# Patient Record
Sex: MALE | Race: WHITE | NOT HISPANIC OR LATINO | Employment: OTHER | URBAN - METROPOLITAN AREA
[De-identification: names, ages, dates, MRNs, and addresses within clinical notes are randomized per-mention and may not be internally consistent; named-entity substitution may affect disease eponyms.]

---

## 2018-08-29 ENCOUNTER — HOSPITAL ENCOUNTER (OUTPATIENT)
Dept: CARDIOLOGY | Facility: HOSPITAL | Age: 73
Discharge: HOME | End: 2018-08-29
Attending: UROLOGY | Admitting: UROLOGY

## 2018-08-29 ENCOUNTER — TRANSCRIBE ORDERS (OUTPATIENT)
Dept: LAB | Facility: HOSPITAL | Age: 73
End: 2018-08-29

## 2018-08-29 ENCOUNTER — HOSPITAL ENCOUNTER (OUTPATIENT)
Dept: GENERAL RADIOLOGY | Facility: HOSPITAL | Age: 73
Discharge: HOME | End: 2018-08-29
Attending: UROLOGY

## 2018-08-29 ENCOUNTER — TRANSCRIBE ORDERS (OUTPATIENT)
Dept: CARDIOLOGY | Facility: HOSPITAL | Age: 73
End: 2018-08-29

## 2018-08-29 ENCOUNTER — LAB (OUTPATIENT)
Dept: LAB | Facility: HOSPITAL | Age: 73
End: 2018-08-29
Attending: UROLOGY

## 2018-08-29 DIAGNOSIS — Z01.818 PRE-OP TESTING: Primary | ICD-10-CM

## 2018-08-29 DIAGNOSIS — Z01.818 PRE-OP TESTING: ICD-10-CM

## 2018-08-29 DIAGNOSIS — Z01.811 PRE-OP CHEST EXAM: ICD-10-CM

## 2018-08-29 LAB
ANION GAP SERPL CALCULATED.3IONS-SCNC: 11.4 MMOL/L
BASOPHILS # BLD AUTO: 0.02 10*3/MM3 (ref 0–0.2)
BASOPHILS NFR BLD AUTO: 0.3 % (ref 0–1.5)
BUN BLD-MCNC: 27 MG/DL (ref 8–23)
BUN/CREAT SERPL: 17.3 (ref 7–25)
CALCIUM SPEC-SCNC: 8.9 MG/DL (ref 8.6–10.5)
CHLORIDE SERPL-SCNC: 93 MMOL/L (ref 98–107)
CO2 SERPL-SCNC: 22.6 MMOL/L (ref 22–29)
CREAT BLD-MCNC: 1.56 MG/DL (ref 0.76–1.27)
DEPRECATED RDW RBC AUTO: 40.2 FL (ref 37–54)
EOSINOPHIL # BLD AUTO: 0.22 10*3/MM3 (ref 0–0.7)
EOSINOPHIL NFR BLD AUTO: 3.3 % (ref 0.3–6.2)
ERYTHROCYTE [DISTWIDTH] IN BLOOD BY AUTOMATED COUNT: 11.9 % (ref 11.5–14.5)
GFR SERPL CREATININE-BSD FRML MDRD: 44 ML/MIN/1.73
GLUCOSE BLD-MCNC: 99 MG/DL (ref 65–99)
HCT VFR BLD AUTO: 41 % (ref 40.4–52.2)
HGB BLD-MCNC: 13.6 G/DL (ref 13.7–17.6)
IMM GRANULOCYTES # BLD: 0 10*3/MM3 (ref 0–0.03)
IMM GRANULOCYTES NFR BLD: 0 % (ref 0–0.5)
LYMPHOCYTES # BLD AUTO: 1.09 10*3/MM3 (ref 0.9–4.8)
LYMPHOCYTES NFR BLD AUTO: 16.3 % (ref 19.6–45.3)
MCH RBC QN AUTO: 30.6 PG (ref 27–32.7)
MCHC RBC AUTO-ENTMCNC: 33.2 G/DL (ref 32.6–36.4)
MCV RBC AUTO: 92.1 FL (ref 79.8–96.2)
MONOCYTES # BLD AUTO: 0.82 10*3/MM3 (ref 0.2–1.2)
MONOCYTES NFR BLD AUTO: 12.2 % (ref 5–12)
NEUTROPHILS # BLD AUTO: 4.55 10*3/MM3 (ref 1.9–8.1)
NEUTROPHILS NFR BLD AUTO: 67.9 % (ref 42.7–76)
PLATELET # BLD AUTO: 163 10*3/MM3 (ref 140–500)
PMV BLD AUTO: 10.4 FL (ref 6–12)
POTASSIUM BLD-SCNC: 4.4 MMOL/L (ref 3.5–5.2)
RBC # BLD AUTO: 4.45 10*6/MM3 (ref 4.6–6)
SODIUM BLD-SCNC: 127 MMOL/L (ref 136–145)
WBC NRBC COR # BLD: 6.7 10*3/MM3 (ref 4.5–10.7)

## 2018-08-29 PROCEDURE — 85025 COMPLETE CBC W/AUTO DIFF WBC: CPT

## 2018-08-29 PROCEDURE — 36415 COLL VENOUS BLD VENIPUNCTURE: CPT

## 2018-08-29 PROCEDURE — 80048 BASIC METABOLIC PNL TOTAL CA: CPT

## 2018-08-29 PROCEDURE — 93005 ELECTROCARDIOGRAM TRACING: CPT | Performed by: UROLOGY

## 2018-08-29 PROCEDURE — 71046 X-RAY EXAM CHEST 2 VIEWS: CPT

## 2018-08-29 PROCEDURE — 93010 ELECTROCARDIOGRAM REPORT: CPT | Performed by: INTERNAL MEDICINE

## 2018-09-25 ENCOUNTER — HOSPITAL ENCOUNTER (OUTPATIENT)
Dept: OTHER | Facility: HOSPITAL | Age: 73
Setting detail: SPECIMEN
Discharge: HOME | End: 2018-09-25
Attending: UROLOGY | Admitting: UROLOGY

## 2018-09-26 ENCOUNTER — HOSPITAL ENCOUNTER (OUTPATIENT)
Dept: OTHER | Facility: HOSPITAL | Age: 73
Setting detail: SPECIMEN
Discharge: HOME | End: 2018-09-26
Attending: UROLOGY | Admitting: UROLOGY

## 2018-09-26 LAB
ANION GAP SERPL CALC-SCNC: 13.9 MMOL/L (ref 10–20)
BACTERIA SPEC AEROBE CULT: NORMAL
BASOPHILS # BLD AUTO: 0 10*3/UL (ref 0–0.2)
BASOPHILS NFR BLD AUTO: 0 % (ref 0–2)
BUN SERPL-MCNC: 24 MG/DL (ref 8–20)
BUN/CREAT SERPL: 12 (ref 6.2–20.3)
CALCIUM SERPL-MCNC: 7.5 MG/DL (ref 8.9–10.3)
CHLORIDE SERPL-SCNC: 94 MMOL/L (ref 101–111)
CONV CO2: 22 MMOL/L (ref 22–32)
CREAT UR-MCNC: 2 MG/DL (ref 0.7–1.2)
DIFFERENTIAL METHOD BLD: (no result)
EOSINOPHIL # BLD AUTO: 0.1 10*3/UL (ref 0–0.3)
EOSINOPHIL # BLD AUTO: 1 % (ref 0–3)
ERYTHROCYTE [DISTWIDTH] IN BLOOD BY AUTOMATED COUNT: 12.8 % (ref 11.5–14.5)
GLUCOSE SERPL-MCNC: 122 MG/DL (ref 65–99)
HCT VFR BLD AUTO: 35.5 % (ref 40–54)
HGB BLD-MCNC: 12.1 G/DL (ref 14–18)
LYMPHOCYTES # BLD AUTO: 0.7 10*3/UL (ref 0.8–4.8)
LYMPHOCYTES NFR BLD AUTO: 5 % (ref 18–42)
Lab: NORMAL
MCH RBC QN AUTO: 31.7 PG (ref 26–32)
MCHC RBC AUTO-ENTMCNC: 34 G/DL (ref 32–36)
MCV RBC AUTO: 93 FL (ref 80–94)
MICRO REPORT STATUS: NORMAL
MONOCYTES # BLD AUTO: 1 10*3/UL (ref 0.1–1.3)
MONOCYTES NFR BLD AUTO: 7 % (ref 2–11)
NEUTROPHILS # BLD AUTO: 11.8 10*3/UL (ref 2.3–8.6)
NEUTROPHILS NFR BLD AUTO: 87 % (ref 50–75)
NRBC BLD AUTO-RTO: 0 /100{WBCS}
NRBC/RBC NFR BLD MANUAL: 0 10*3/UL
PLATELET # BLD AUTO: 260 10*3/UL (ref 150–450)
PMV BLD AUTO: 8.5 FL (ref 7.4–10.4)
POTASSIUM SERPL-SCNC: 4.9 MMOL/L (ref 3.6–5.1)
RBC # BLD AUTO: 3.82 10*6/UL (ref 4.6–6)
SODIUM SERPL-SCNC: 125 MMOL/L (ref 136–144)
SPECIMEN SOURCE: NORMAL
WBC # BLD AUTO: 13.6 10*3/UL (ref 4.5–11.5)

## 2018-10-10 ENCOUNTER — APPOINTMENT (OUTPATIENT)
Dept: GENERAL RADIOLOGY | Facility: HOSPITAL | Age: 73
End: 2018-10-10

## 2018-10-10 ENCOUNTER — APPOINTMENT (OUTPATIENT)
Dept: CARDIOLOGY | Facility: HOSPITAL | Age: 73
End: 2018-10-10
Attending: EMERGENCY MEDICINE

## 2018-10-10 ENCOUNTER — HOSPITAL ENCOUNTER (EMERGENCY)
Facility: HOSPITAL | Age: 73
Discharge: HOME | End: 2018-10-10
Attending: EMERGENCY MEDICINE | Admitting: EMERGENCY MEDICINE

## 2018-10-10 VITALS
OXYGEN SATURATION: 98 % | SYSTOLIC BLOOD PRESSURE: 143 MMHG | HEIGHT: 70 IN | WEIGHT: 193.9 LBS | HEART RATE: 78 BPM | BODY MASS INDEX: 27.76 KG/M2 | DIASTOLIC BLOOD PRESSURE: 82 MMHG | TEMPERATURE: 99.9 F | RESPIRATION RATE: 16 BRPM

## 2018-10-10 DIAGNOSIS — N39.0 ACUTE UTI: Primary | ICD-10-CM

## 2018-10-10 DIAGNOSIS — D72.828 OTHER ELEVATED WHITE BLOOD CELL (WBC) COUNT: ICD-10-CM

## 2018-10-10 LAB
ALBUMIN SERPL-MCNC: 3.8 G/DL (ref 3.5–5.2)
ALBUMIN/GLOB SERPL: 1.2 G/DL
ALP SERPL-CCNC: 93 U/L (ref 39–117)
ALT SERPL W P-5'-P-CCNC: 23 U/L (ref 1–41)
ANION GAP SERPL CALCULATED.3IONS-SCNC: 11.1 MMOL/L
AST SERPL-CCNC: 18 U/L (ref 1–40)
BACTERIA UR QL AUTO: ABNORMAL /HPF
BASOPHILS # BLD AUTO: 0.03 10*3/MM3 (ref 0–0.2)
BASOPHILS NFR BLD AUTO: 0.2 % (ref 0–1.5)
BILIRUB SERPL-MCNC: 0.7 MG/DL (ref 0.1–1.2)
BILIRUB UR QL STRIP: NEGATIVE
BUN BLD-MCNC: 26 MG/DL (ref 8–23)
BUN/CREAT SERPL: 24.8 (ref 7–25)
CALCIUM SPEC-SCNC: 9.2 MG/DL (ref 8.6–10.5)
CHLORIDE SERPL-SCNC: 95 MMOL/L (ref 98–107)
CLARITY UR: ABNORMAL
CO2 SERPL-SCNC: 26.9 MMOL/L (ref 22–29)
COLOR UR: YELLOW
CREAT BLD-MCNC: 1.05 MG/DL (ref 0.76–1.27)
D DIMER PPP FEU-MCNC: 2.3 MCGFEU/ML (ref 0–0.49)
D-LACTATE SERPL-SCNC: 1 MMOL/L (ref 0.5–2)
DEPRECATED RDW RBC AUTO: 42.2 FL (ref 37–54)
EOSINOPHIL # BLD AUTO: 0.18 10*3/MM3 (ref 0–0.7)
EOSINOPHIL NFR BLD AUTO: 1.1 % (ref 0.3–6.2)
ERYTHROCYTE [DISTWIDTH] IN BLOOD BY AUTOMATED COUNT: 12.5 % (ref 11.5–14.5)
GFR SERPL CREATININE-BSD FRML MDRD: 69 ML/MIN/1.73
GLOBULIN UR ELPH-MCNC: 3.3 GM/DL
GLUCOSE BLD-MCNC: 99 MG/DL (ref 65–99)
GLUCOSE UR STRIP-MCNC: NEGATIVE MG/DL
HCT VFR BLD AUTO: 40.5 % (ref 40.4–52.2)
HGB BLD-MCNC: 13 G/DL (ref 13.7–17.6)
HGB UR QL STRIP.AUTO: ABNORMAL
HOLD SPECIMEN: NORMAL
HOLD SPECIMEN: NORMAL
HYALINE CASTS UR QL AUTO: ABNORMAL /LPF
IMM GRANULOCYTES # BLD: 0.08 10*3/MM3 (ref 0–0.03)
IMM GRANULOCYTES NFR BLD: 0.5 % (ref 0–0.5)
KETONES UR QL STRIP: NEGATIVE
LEUKOCYTE ESTERASE UR QL STRIP.AUTO: ABNORMAL
LYMPHOCYTES # BLD AUTO: 1.25 10*3/MM3 (ref 0.9–4.8)
LYMPHOCYTES NFR BLD AUTO: 7.7 % (ref 19.6–45.3)
MCH RBC QN AUTO: 29.7 PG (ref 27–32.7)
MCHC RBC AUTO-ENTMCNC: 32.1 G/DL (ref 32.6–36.4)
MCV RBC AUTO: 92.5 FL (ref 79.8–96.2)
MONOCYTES # BLD AUTO: 0.62 10*3/MM3 (ref 0.2–1.2)
MONOCYTES NFR BLD AUTO: 3.8 % (ref 5–12)
NEUTROPHILS # BLD AUTO: 14.12 10*3/MM3 (ref 1.9–8.1)
NEUTROPHILS NFR BLD AUTO: 86.7 % (ref 42.7–76)
NITRITE UR QL STRIP: POSITIVE
PH UR STRIP.AUTO: 6 [PH] (ref 5–8)
PLATELET # BLD AUTO: 313 10*3/MM3 (ref 140–500)
PMV BLD AUTO: 9.8 FL (ref 6–12)
POTASSIUM BLD-SCNC: 4.4 MMOL/L (ref 3.5–5.2)
PROT SERPL-MCNC: 7.1 G/DL (ref 6–8.5)
PROT UR QL STRIP: ABNORMAL
RBC # BLD AUTO: 4.38 10*6/MM3 (ref 4.6–6)
RBC # UR: ABNORMAL /HPF
REF LAB TEST METHOD: ABNORMAL
SODIUM BLD-SCNC: 133 MMOL/L (ref 136–145)
SP GR UR STRIP: 1.01 (ref 1–1.03)
SQUAMOUS #/AREA URNS HPF: ABNORMAL /HPF
UROBILINOGEN UR QL STRIP: ABNORMAL
WBC NRBC COR # BLD: 16.28 10*3/MM3 (ref 4.5–10.7)
WBC UR QL AUTO: ABNORMAL /HPF
WHOLE BLOOD HOLD SPECIMEN: NORMAL
WHOLE BLOOD HOLD SPECIMEN: NORMAL

## 2018-10-10 PROCEDURE — 83605 ASSAY OF LACTIC ACID: CPT | Performed by: EMERGENCY MEDICINE

## 2018-10-10 PROCEDURE — 87077 CULTURE AEROBIC IDENTIFY: CPT | Performed by: EMERGENCY MEDICINE

## 2018-10-10 PROCEDURE — 96365 THER/PROPH/DIAG IV INF INIT: CPT

## 2018-10-10 PROCEDURE — 81001 URINALYSIS AUTO W/SCOPE: CPT | Performed by: EMERGENCY MEDICINE

## 2018-10-10 PROCEDURE — 96361 HYDRATE IV INFUSION ADD-ON: CPT

## 2018-10-10 PROCEDURE — 93970 EXTREMITY STUDY: CPT

## 2018-10-10 PROCEDURE — 80053 COMPREHEN METABOLIC PANEL: CPT | Performed by: EMERGENCY MEDICINE

## 2018-10-10 PROCEDURE — 85379 FIBRIN DEGRADATION QUANT: CPT | Performed by: EMERGENCY MEDICINE

## 2018-10-10 PROCEDURE — 99285 EMERGENCY DEPT VISIT HI MDM: CPT

## 2018-10-10 PROCEDURE — 85025 COMPLETE CBC W/AUTO DIFF WBC: CPT | Performed by: EMERGENCY MEDICINE

## 2018-10-10 PROCEDURE — 25010000002 CEFTRIAXONE PER 250 MG: Performed by: EMERGENCY MEDICINE

## 2018-10-10 PROCEDURE — 87186 SC STD MICRODIL/AGAR DIL: CPT | Performed by: EMERGENCY MEDICINE

## 2018-10-10 PROCEDURE — 87040 BLOOD CULTURE FOR BACTERIA: CPT | Performed by: EMERGENCY MEDICINE

## 2018-10-10 PROCEDURE — 87086 URINE CULTURE/COLONY COUNT: CPT | Performed by: EMERGENCY MEDICINE

## 2018-10-10 PROCEDURE — 71046 X-RAY EXAM CHEST 2 VIEWS: CPT

## 2018-10-10 RX ORDER — CEFTRIAXONE SODIUM 1 G/50ML
1 INJECTION, SOLUTION INTRAVENOUS ONCE
Status: COMPLETED | OUTPATIENT
Start: 2018-10-10 | End: 2018-10-10

## 2018-10-10 RX ORDER — VENLAFAXINE HYDROCHLORIDE 150 MG/1
150 CAPSULE, EXTENDED RELEASE ORAL DAILY
COMMUNITY

## 2018-10-10 RX ORDER — CIPROFLOXACIN 500 MG/1
500 TABLET, FILM COATED ORAL 2 TIMES DAILY
Qty: 20 TABLET | Refills: 0 | Status: SHIPPED | OUTPATIENT
Start: 2018-10-10 | End: 2018-10-12 | Stop reason: HOSPADM

## 2018-10-10 RX ORDER — ACETAMINOPHEN 500 MG
1000 TABLET ORAL ONCE
Status: COMPLETED | OUTPATIENT
Start: 2018-10-10 | End: 2018-10-10

## 2018-10-10 RX ORDER — AMLODIPINE BESYLATE 10 MG/1
10 TABLET ORAL DAILY
COMMUNITY

## 2018-10-10 RX ORDER — SODIUM CHLORIDE 0.9 % (FLUSH) 0.9 %
10 SYRINGE (ML) INJECTION AS NEEDED
Status: DISCONTINUED | OUTPATIENT
Start: 2018-10-10 | End: 2018-10-11 | Stop reason: HOSPADM

## 2018-10-10 RX ADMIN — CEFTRIAXONE SODIUM 1 G: 1 INJECTION, SOLUTION INTRAVENOUS at 20:59

## 2018-10-10 RX ADMIN — ACETAMINOPHEN 1000 MG: 500 TABLET, FILM COATED ORAL at 18:33

## 2018-10-10 RX ADMIN — SODIUM CHLORIDE 1000 ML: 9 INJECTION, SOLUTION INTRAVENOUS at 17:55

## 2018-10-10 NOTE — ED PROVIDER NOTES
" EMERGENCY DEPARTMENT ENCOUNTER    CHIEF COMPLAINT  Chief Complaint: R leg pain  History given by: pt  History limited by: none  Room Number: 15/15  PMD: System, Provider Not In  Urologist: Dr Weber    HPI:  Pt is a 73 y.o. male who presents complaining of severe \"aches\" in right thigh that has gotten progressively worse since 0200 this morning. Pt also admits to chills but denies fever, cough, SOA, hematuria, vomiting, diarrhea, decreased appetite, abdominal pain, back pain. Pt reports that he had surgery to repair his prostate two weeks ago and the pt went to see the urologist today due to aches and was sent here to rule out blood clots    Duration:  15 hours  Onset: gradual  Timing: constant  Location: right thigh  Radiation: none  Quality: \"aches\"  Intensity/Severity: severe  Progression: progressively worse  Associated Symptoms: chills  Aggravating Factors: none  Alleviating Factors: none  Previous Episodes: none  Treatment before arrival: none    PAST MEDICAL HISTORY  Active Ambulatory Problems     Diagnosis Date Noted   • No Active Ambulatory Problems     Resolved Ambulatory Problems     Diagnosis Date Noted   • No Resolved Ambulatory Problems     Past Medical History:   Diagnosis Date   • Depression    • History of nephrectomy    • Hypertension    • Mitral valve prolapse    • Sepsis (CMS/HCC)    • UTI (urinary tract infection)        PAST SURGICAL HISTORY  Past Surgical History:   Procedure Laterality Date   • HERNIA REPAIR     • NEPHRECTOMY Left    • ROTATOR CUFF REPAIR Left        FAMILY HISTORY  History reviewed. No pertinent family history.    SOCIAL HISTORY  Social History     Social History   • Marital status:      Spouse name: N/A   • Number of children: N/A   • Years of education: N/A     Occupational History   • Not on file.     Social History Main Topics   • Smoking status: Former Smoker     Quit date: 10/10/1984   • Smokeless tobacco: Never Used   • Alcohol use 16.8 oz/week     28 Standard " drinks or equivalent per week   • Drug use: No   • Sexual activity: Defer     Other Topics Concern   • Not on file     Social History Narrative   • No narrative on file       ALLERGIES  Patient has no known allergies.    REVIEW OF SYSTEMS  Review of Systems   Constitutional: Positive for chills. Negative for activity change, appetite change and fever.   HENT: Negative for congestion and sore throat.    Eyes: Negative.    Respiratory: Negative for cough and shortness of breath.    Cardiovascular: Negative for chest pain and leg swelling.   Gastrointestinal: Negative for abdominal pain, diarrhea and vomiting.   Endocrine: Negative.    Genitourinary: Negative for decreased urine volume and dysuria.   Musculoskeletal: Positive for myalgias (right thigh). Negative for neck pain.   Skin: Negative for rash and wound.   Allergic/Immunologic: Negative.    Neurological: Negative for weakness, numbness and headaches.   Hematological: Negative.    Psychiatric/Behavioral: Negative.    All other systems reviewed and are negative.      PHYSICAL EXAM  ED Triage Vitals [10/10/18 1727]   Temp Heart Rate Resp BP SpO2   100.3 °F (37.9 °C) 84 18 -- 99 %      Temp src Heart Rate Source Patient Position BP Location FiO2 (%)   Tympanic Monitor -- -- --       Physical Exam   Constitutional: He is oriented to person, place, and time. No distress.   Pt is shaking with chills   HENT:   Head: Normocephalic and atraumatic.   Eyes: Pupils are equal, round, and reactive to light. EOM are normal.   Neck: Normal range of motion. Neck supple.   Cardiovascular: Regular rhythm and normal heart sounds.  Tachycardia present.    Pulmonary/Chest: Effort normal and breath sounds normal. No respiratory distress.   Abdominal: Soft. There is no tenderness. There is no rebound and no guarding.   Musculoskeletal: Normal range of motion. He exhibits no edema.   Muscle tenderness to palpation of bilateral legs particularly in the right thigh with no swelling,  cord, or clinical signs of DVT or cellulitis   Neurological: He is alert and oriented to person, place, and time. He has normal sensation and normal strength.   Skin: Skin is warm and dry.   Psychiatric: Mood and affect normal.   Nursing note and vitals reviewed.    LAB RESULTS  Lab Results (last 24 hours)     Procedure Component Value Units Date/Time    CBC & Differential [[id number]] Collected:  10/10/18 1759    Specimen:  Blood Updated:  10/10/18 1824    Narrative:       The following orders were created for panel order CBC & Differential.  Procedure                               Abnormality         Status                     ---------                               -----------         ------                     CBC Auto Differential[[id number]]        Abnormal            Final result                 Please view results for these tests on the individual orders.    Blood Culture - Blood, [[id number]] Collected:  10/10/18 1759    Specimen:  Blood from Arm, Left Updated:  10/10/18 1815    D-dimer, Quantitative [[id number]]  (Abnormal) Collected:  10/10/18 1759    Specimen:  Blood Updated:  10/10/18 1849     D-Dimer, Quantitative 2.30 (H) MCGFEU/mL     Narrative:       The Stago D-Dimer test used in conjunction with a clinical pretest probability (PTP) assessment model, has been approved by the FDA to rule out the presence of venous thromboembolism (VTE) in outpatients suspected of deep venous thrombosis (DVT) or pulmonary embolism (PE).     CBC Auto Differential [[id number]]  (Abnormal) Collected:  10/10/18 1759    Specimen:  Blood Updated:  10/10/18 1824     WBC 16.28 (H) 10*3/mm3      RBC 4.38 (L) 10*6/mm3      Hemoglobin 13.0 (L) g/dL      Hematocrit 40.5 %      MCV 92.5 fL      MCH 29.7 pg      MCHC 32.1 (L) g/dL      RDW 12.5 %      RDW-SD 42.2 fl      MPV 9.8 fL      Platelets 313 10*3/mm3      Neutrophil % 86.7 (H) %      Lymphocyte % 7.7 (L) %      Monocyte % 3.8 (L) %      Eosinophil % 1.1 %      Basophil % 0.2 %       Immature Grans % 0.5 %      Neutrophils, Absolute 14.12 (H) 10*3/mm3      Lymphocytes, Absolute 1.25 10*3/mm3      Monocytes, Absolute 0.62 10*3/mm3      Eosinophils, Absolute 0.18 10*3/mm3      Basophils, Absolute 0.03 10*3/mm3      Immature Grans, Absolute 0.08 (H) 10*3/mm3     Lactic Acid, Plasma [[id number]]  (Normal) Collected:  10/10/18 1843    Specimen:  Blood Updated:  10/10/18 2016     Lactate 1.0 mmol/L     Blood Culture - Blood, [[id number]] Collected:  10/10/18 1843    Specimen:  Blood from Arm, Left Updated:  10/10/18 1847    Comprehensive Metabolic Panel [[id number]]  (Abnormal) Collected:  10/10/18 1901    Specimen:  Blood Updated:  10/10/18 1938     Glucose 99 mg/dL      BUN 26 (H) mg/dL      Creatinine 1.05 mg/dL      Sodium 133 (L) mmol/L      Potassium 4.4 mmol/L      Chloride 95 (L) mmol/L      CO2 26.9 mmol/L      Calcium 9.2 mg/dL      Total Protein 7.1 g/dL      Albumin 3.80 g/dL      ALT (SGPT) 23 U/L      AST (SGOT) 18 U/L      Alkaline Phosphatase 93 U/L      Total Bilirubin 0.7 mg/dL      eGFR Non African Amer 69 mL/min/1.73      Globulin 3.3 gm/dL      A/G Ratio 1.2 g/dL      BUN/Creatinine Ratio 24.8     Anion Gap 11.1 mmol/L     Narrative:       The MDRD GFR formula is only valid for adults with stable renal function between ages 18 and 70.    Urinalysis With Culture If Indicated - Urine, Clean Catch [[id number]]  (Abnormal) Collected:  10/10/18 2027    Specimen:  Urine from Urine, Clean Catch Updated:  10/10/18 2040     Color, UA Yellow     Appearance, UA Turbid (A)     pH, UA 6.0     Specific Gravity, UA 1.014     Glucose, UA Negative     Ketones, UA Negative     Bilirubin, UA Negative     Blood, UA Large (3+) (A)     Protein,  mg/dL (2+) (A)     Leuk Esterase, UA Large (3+) (A)     Nitrite, UA Positive (A)     Urobilinogen, UA 0.2 E.U./dL    Urinalysis, Microscopic Only - Urine, Clean Catch [[id number]]  (Abnormal) Collected:  10/10/18 2027    Specimen:  Urine from Urine, Clean  Catch Updated:  10/10/18 2041     RBC, UA Too Numerous to Count (A) /HPF      WBC, UA Too Numerous to Count (A) /HPF      Bacteria, UA None Seen /HPF      Squamous Epithelial Cells, UA 0-2 /HPF      Hyaline Casts, UA 3-6 /LPF      Methodology Automated Microscopy    Urine Culture - Urine, [[id number]] Collected:  10/10/18 2027    Specimen:  Urine from Urine, Clean Catch Updated:  10/10/18 2041          I ordered the above labs and reviewed the results    RADIOLOGY  XR Chest 2 View   Compared to the previous chest x-ray dated 8/29/2018. The lungs are  fairly well-expanded and appear free of focal infiltrates. There are no  pleural effusions. The cardiomediastinal silhouette is unremarkable.     IMPRESSIONS: No evidence of active disease within the chest.     This report was finalized on 10/10/2018 6:33 PM by Dr. Luis Kim M.D.   Doppler  Negative        I ordered the above noted radiological studies. Interpreted by radiologist. Reviewed by me in PACS.       PROCEDURES  Procedures      PROGRESS AND CONSULTS     1742  Ordered labs and chest XR for further viewing. Ordered tylenol for fever and IV fluids.    1840  Ordered Doppler for further viewing. Result was negative for DVT.    2044  Ordered rocephin for infection. Ordered call from urology.    2046  Rechecked patient who is resting comfortably. Discussed all lab and test results. Discussed plan to start antibiotics and talk to pt urologist. Pt understands and agrees with the plan. All questions have been answered.    2054  Discussed case with Dr Zimmerman, Urology  Reviewed history, exam, results and treatments.  Discussed concerns and plan of care.   Dr Zimmerman recommends a shot of rocephin, putting the pt on Cipro, and having the pt follow up in the office in the next few days.     2110  Rechecked patient who is resting comfortably. Discussed all lab and test results. Discussed plan to discharge the pt and have the pt follow up with urologist unless he  experiences any high fever and then he should RTER. Pt understands and agrees with the plan. All questions have been answered.          MEDICAL DECISION MAKING  Results were reviewed/discussed with the patient and they were also made aware of online access. Pt also made aware that some labs, such as cultures, will not be resulted during ER visit and follow up with PMD is necessary.     MDM  Number of Diagnoses or Management Options     Amount and/or Complexity of Data Reviewed  Clinical lab tests: ordered and reviewed (D-Dimer 2.30, WBC 16.28, UA: RBC too numerous to count, WBC too numerous to count)  Tests in the radiology section of CPT®: ordered and reviewed (Doppler- Negative  Chest XR - NAD)  Decide to obtain previous medical records or to obtain history from someone other than the patient: yes (EPIC)           DIAGNOSIS  Final diagnoses:   Acute UTI   Other elevated white blood cell (WBC) count       DISPOSITION  DISCHARGE    Patient discharged in stable condition.    Reviewed implications of results, diagnosis, meds, responsibility to follow up, warning signs and symptoms of possible worsening, potential complications and reasons to return to ER.    Patient/Family voiced understanding of above instructions.    Discussed plan for discharge, as there is no emergent indication for admission. Patient referred to primary care provider for BP management due to today's BP. Pt/family is agreeable and understands need for follow up and repeat testing.  Pt is aware that discharge does not mean that nothing is wrong but it indicates no emergency is present that requires admission and they must continue care with follow-up as given below or physician of their choice.     FOLLOW-UP  Catarino Weber Jr., MD  [address] [place] [address] [place] [address] [phone]      call office tomorrow for close follow up in 1 to 2 days         Medication List      New Prescriptions    ciprofloxacin 500 MG tablet  Commonly known  as:  CIPRO  Take 1 tablet by mouth 2 (Two) Times a Day.              Latest Documented Vital Signs:  As of 9:14 PM  BP- 150/80 HR- 77 Temp- (!) 100.6 °F (38.1 °C) (Oral) O2 sat- 97%    --  Documentation assistance provided by vi Green for Dr Garcia.  Information recorded by the scribe was done at my direction and has been verified and validated by me.                 Mary Green  10/10/18 2114       Gómez Garcia MD  10/10/18 8358

## 2018-10-10 NOTE — ED NOTES
"Pt reports severe aches in thighs bilaterally since about 2 am this morning. Pt had his prostate repaired 2 weeks ago. Recovery was going okay until this am when he developed severe pain in his thighs. Pt states he was in Dr. Weber's  Office today and he sent him here to \"check for a bladder infection and blood clots\". Pt reports passing a little blood in the urine after his surgery, but that has stopped. Pt reports prior history of urosepsis. Pt denies increased urinary frequency or dysuria. Pt is alert and oriented. Pt is shivering.     Anisa Magaña, VEL  10/10/18 3417    "

## 2018-10-10 NOTE — PROGRESS NOTES
BLEV doppler completed.  Preliminary results:  Negative for DVT and STP in bilateral lower extremities.  Results given to Dr. Garcia.

## 2018-10-11 ENCOUNTER — HOSPITAL ENCOUNTER (OUTPATIENT)
Facility: HOSPITAL | Age: 73
Setting detail: OBSERVATION
Discharge: HOME | End: 2018-10-12
Attending: UROLOGY | Admitting: UROLOGY

## 2018-10-11 LAB
BH CV LOW VAS LEFT SAPHENOFEMORAL JUNCTION SPONT: 1
BH CV LOWER VASCULAR LEFT COMMON FEMORAL AUGMENT: NORMAL
BH CV LOWER VASCULAR LEFT COMMON FEMORAL COMPETENT: NORMAL
BH CV LOWER VASCULAR LEFT COMMON FEMORAL COMPRESS: NORMAL
BH CV LOWER VASCULAR LEFT COMMON FEMORAL PHASIC: NORMAL
BH CV LOWER VASCULAR LEFT COMMON FEMORAL SPONT: NORMAL
BH CV LOWER VASCULAR LEFT DISTAL FEMORAL COMPRESS: NORMAL
BH CV LOWER VASCULAR LEFT GASTRONEMIUS COMPRESS: NORMAL
BH CV LOWER VASCULAR LEFT GREATER SAPH AK COMPRESS: NORMAL
BH CV LOWER VASCULAR LEFT GREATER SAPH BK COMPRESS: NORMAL
BH CV LOWER VASCULAR LEFT MID FEMORAL AUGMENT: NORMAL
BH CV LOWER VASCULAR LEFT MID FEMORAL COMPETENT: NORMAL
BH CV LOWER VASCULAR LEFT MID FEMORAL COMPRESS: NORMAL
BH CV LOWER VASCULAR LEFT MID FEMORAL PHASIC: NORMAL
BH CV LOWER VASCULAR LEFT MID FEMORAL SPONT: NORMAL
BH CV LOWER VASCULAR LEFT PERONEAL COMPRESS: NORMAL
BH CV LOWER VASCULAR LEFT POPLITEAL AUGMENT: NORMAL
BH CV LOWER VASCULAR LEFT POPLITEAL COMPETENT: NORMAL
BH CV LOWER VASCULAR LEFT POPLITEAL COMPRESS: NORMAL
BH CV LOWER VASCULAR LEFT POPLITEAL PHASIC: NORMAL
BH CV LOWER VASCULAR LEFT POPLITEAL SPONT: NORMAL
BH CV LOWER VASCULAR LEFT POSTERIOR TIBIAL COMPRESS: NORMAL
BH CV LOWER VASCULAR LEFT PROXIMAL FEMORAL COMPRESS: NORMAL
BH CV LOWER VASCULAR LEFT SAPHENOFEMORAL JUNCTION AUGMENT: NORMAL
BH CV LOWER VASCULAR LEFT SAPHENOFEMORAL JUNCTION COMPETENT: NORMAL
BH CV LOWER VASCULAR LEFT SAPHENOFEMORAL JUNCTION COMPRESS: NORMAL
BH CV LOWER VASCULAR LEFT SAPHENOFEMORAL JUNCTION PHASIC: NORMAL
BH CV LOWER VASCULAR LEFT SAPHENOFEMORAL JUNCTION SPONT: NORMAL
BH CV LOWER VASCULAR RIGHT COMMON FEMORAL AUGMENT: NORMAL
BH CV LOWER VASCULAR RIGHT COMMON FEMORAL COMPETENT: NORMAL
BH CV LOWER VASCULAR RIGHT COMMON FEMORAL COMPRESS: NORMAL
BH CV LOWER VASCULAR RIGHT COMMON FEMORAL PHASIC: NORMAL
BH CV LOWER VASCULAR RIGHT COMMON FEMORAL SPONT: NORMAL
BH CV LOWER VASCULAR RIGHT DISTAL FEMORAL COMPRESS: NORMAL
BH CV LOWER VASCULAR RIGHT GASTRONEMIUS COMPRESS: NORMAL
BH CV LOWER VASCULAR RIGHT GREATER SAPH AK COMPRESS: NORMAL
BH CV LOWER VASCULAR RIGHT GREATER SAPH BK COMPRESS: NORMAL
BH CV LOWER VASCULAR RIGHT MID FEMORAL AUGMENT: NORMAL
BH CV LOWER VASCULAR RIGHT MID FEMORAL COMPETENT: NORMAL
BH CV LOWER VASCULAR RIGHT MID FEMORAL COMPRESS: NORMAL
BH CV LOWER VASCULAR RIGHT MID FEMORAL PHASIC: NORMAL
BH CV LOWER VASCULAR RIGHT MID FEMORAL SPONT: NORMAL
BH CV LOWER VASCULAR RIGHT PERONEAL COMPRESS: NORMAL
BH CV LOWER VASCULAR RIGHT POPLITEAL AUGMENT: NORMAL
BH CV LOWER VASCULAR RIGHT POPLITEAL COMPETENT: NORMAL
BH CV LOWER VASCULAR RIGHT POPLITEAL COMPRESS: NORMAL
BH CV LOWER VASCULAR RIGHT POPLITEAL PHASIC: NORMAL
BH CV LOWER VASCULAR RIGHT POPLITEAL SPONT: NORMAL
BH CV LOWER VASCULAR RIGHT POSTERIOR TIBIAL COMPRESS: NORMAL
BH CV LOWER VASCULAR RIGHT PROXIMAL FEMORAL COMPRESS: NORMAL
BH CV LOWER VASCULAR RIGHT SAPHENOFEMORAL JUNCTION AUGMENT: NORMAL
BH CV LOWER VASCULAR RIGHT SAPHENOFEMORAL JUNCTION COMPETENT: NORMAL
BH CV LOWER VASCULAR RIGHT SAPHENOFEMORAL JUNCTION COMPRESS: NORMAL
BH CV LOWER VASCULAR RIGHT SAPHENOFEMORAL JUNCTION PHASIC: NORMAL
BH CV LOWER VASCULAR RIGHT SAPHENOFEMORAL JUNCTION SPONT: NORMAL

## 2018-10-11 PROCEDURE — G0378 HOSPITAL OBSERVATION PER HR: HCPCS

## 2018-10-11 PROCEDURE — 96365 THER/PROPH/DIAG IV INF INIT: CPT

## 2018-10-11 PROCEDURE — 96361 HYDRATE IV INFUSION ADD-ON: CPT

## 2018-10-11 PROCEDURE — 87040 BLOOD CULTURE FOR BACTERIA: CPT | Performed by: UROLOGY

## 2018-10-11 PROCEDURE — 25010000002 CEFTRIAXONE PER 250 MG: Performed by: UROLOGY

## 2018-10-11 RX ORDER — SODIUM CHLORIDE 9 MG/ML
100 INJECTION, SOLUTION INTRAVENOUS CONTINUOUS
Status: DISCONTINUED | OUTPATIENT
Start: 2018-10-11 | End: 2018-10-12 | Stop reason: HOSPADM

## 2018-10-11 RX ORDER — AMLODIPINE BESYLATE 10 MG/1
10 TABLET ORAL
Status: DISCONTINUED | OUTPATIENT
Start: 2018-10-11 | End: 2018-10-12 | Stop reason: HOSPADM

## 2018-10-11 RX ORDER — CEFTRIAXONE SODIUM 1 G/50ML
1 INJECTION, SOLUTION INTRAVENOUS EVERY 24 HOURS
Status: DISCONTINUED | OUTPATIENT
Start: 2018-10-11 | End: 2018-10-12 | Stop reason: HOSPADM

## 2018-10-11 RX ORDER — CLONAZEPAM 1 MG/1
1 TABLET ORAL NIGHTLY PRN
Status: DISCONTINUED | OUTPATIENT
Start: 2018-10-11 | End: 2018-10-12 | Stop reason: HOSPADM

## 2018-10-11 RX ORDER — ONDANSETRON 2 MG/ML
4 INJECTION INTRAMUSCULAR; INTRAVENOUS EVERY 6 HOURS PRN
Status: DISCONTINUED | OUTPATIENT
Start: 2018-10-11 | End: 2018-10-12 | Stop reason: HOSPADM

## 2018-10-11 RX ORDER — VENLAFAXINE HYDROCHLORIDE 150 MG/1
150 CAPSULE, EXTENDED RELEASE ORAL
Status: DISCONTINUED | OUTPATIENT
Start: 2018-10-12 | End: 2018-10-12 | Stop reason: HOSPADM

## 2018-10-11 RX ADMIN — CEFTRIAXONE SODIUM 1 G: 1 INJECTION, SOLUTION INTRAVENOUS at 21:04

## 2018-10-11 RX ADMIN — SODIUM CHLORIDE 100 ML/HR: 9 INJECTION, SOLUTION INTRAVENOUS at 19:07

## 2018-10-11 NOTE — PLAN OF CARE
Problem: Infection, Risk/Actual (Adult)  Goal: Identify Related Risk Factors and Signs and Symptoms  Outcome: Outcome(s) achieved Date Met: 10/11/18   10/11/18 1540   Infection, Risk/Actual (Adult)   Related Risk Factors (Infection, Risk/Actual) surgery/procedure   Signs and Symptoms (Infection, Risk/Actual) body temperature changes;chills;lab value changes;pain

## 2018-10-11 NOTE — DISCHARGE INSTRUCTIONS
Drink plenty of fluids.  Return for fever, pain, difficulty urinating, vomiting or any other concerns.

## 2018-10-12 VITALS
OXYGEN SATURATION: 99 % | TEMPERATURE: 98 F | RESPIRATION RATE: 18 BRPM | HEART RATE: 63 BPM | HEIGHT: 70 IN | WEIGHT: 186 LBS | DIASTOLIC BLOOD PRESSURE: 80 MMHG | BODY MASS INDEX: 26.63 KG/M2 | SYSTOLIC BLOOD PRESSURE: 140 MMHG

## 2018-10-12 PROBLEM — N39.0 UTI (URINARY TRACT INFECTION): Status: ACTIVE | Noted: 2018-10-12

## 2018-10-12 LAB
ANION GAP SERPL CALCULATED.3IONS-SCNC: 9.6 MMOL/L
BACTERIA SPEC AEROBE CULT: ABNORMAL
BASOPHILS # BLD AUTO: 0.02 10*3/MM3 (ref 0–0.2)
BASOPHILS NFR BLD AUTO: 0.3 % (ref 0–1.5)
BUN BLD-MCNC: 19 MG/DL (ref 8–23)
BUN/CREAT SERPL: 20.2 (ref 7–25)
CALCIUM SPEC-SCNC: 8.8 MG/DL (ref 8.6–10.5)
CHLORIDE SERPL-SCNC: 101 MMOL/L (ref 98–107)
CO2 SERPL-SCNC: 22.4 MMOL/L (ref 22–29)
CREAT BLD-MCNC: 0.94 MG/DL (ref 0.76–1.27)
DEPRECATED RDW RBC AUTO: 42.7 FL (ref 37–54)
EOSINOPHIL # BLD AUTO: 0.16 10*3/MM3 (ref 0–0.7)
EOSINOPHIL NFR BLD AUTO: 2.1 % (ref 0.3–6.2)
ERYTHROCYTE [DISTWIDTH] IN BLOOD BY AUTOMATED COUNT: 12.7 % (ref 11.5–14.5)
GFR SERPL CREATININE-BSD FRML MDRD: 79 ML/MIN/1.73
GLUCOSE BLD-MCNC: 118 MG/DL (ref 65–99)
HCT VFR BLD AUTO: 33.3 % (ref 40.4–52.2)
HGB BLD-MCNC: 10.9 G/DL (ref 13.7–17.6)
IMM GRANULOCYTES # BLD: 0.03 10*3/MM3 (ref 0–0.03)
IMM GRANULOCYTES NFR BLD: 0.4 % (ref 0–0.5)
LYMPHOCYTES # BLD AUTO: 1.14 10*3/MM3 (ref 0.9–4.8)
LYMPHOCYTES NFR BLD AUTO: 14.9 % (ref 19.6–45.3)
MCH RBC QN AUTO: 30 PG (ref 27–32.7)
MCHC RBC AUTO-ENTMCNC: 32.7 G/DL (ref 32.6–36.4)
MCV RBC AUTO: 91.7 FL (ref 79.8–96.2)
MONOCYTES # BLD AUTO: 1.16 10*3/MM3 (ref 0.2–1.2)
MONOCYTES NFR BLD AUTO: 15.2 % (ref 5–12)
NEUTROPHILS # BLD AUTO: 5.17 10*3/MM3 (ref 1.9–8.1)
NEUTROPHILS NFR BLD AUTO: 67.5 % (ref 42.7–76)
PLATELET # BLD AUTO: 270 10*3/MM3 (ref 140–500)
PMV BLD AUTO: 9.8 FL (ref 6–12)
POTASSIUM BLD-SCNC: 4 MMOL/L (ref 3.5–5.2)
RBC # BLD AUTO: 3.63 10*6/MM3 (ref 4.6–6)
SODIUM BLD-SCNC: 133 MMOL/L (ref 136–145)
WBC NRBC COR # BLD: 7.65 10*3/MM3 (ref 4.5–10.7)

## 2018-10-12 PROCEDURE — 25010000002 INFLUENZA VAC SUBUNIT QUAD 0.5 ML SUSPENSION PREFILLED SYRINGE: Performed by: UROLOGY

## 2018-10-12 PROCEDURE — 85025 COMPLETE CBC W/AUTO DIFF WBC: CPT | Performed by: UROLOGY

## 2018-10-12 PROCEDURE — G0008 ADMIN INFLUENZA VIRUS VAC: HCPCS | Performed by: UROLOGY

## 2018-10-12 PROCEDURE — 80048 BASIC METABOLIC PNL TOTAL CA: CPT | Performed by: UROLOGY

## 2018-10-12 PROCEDURE — G0378 HOSPITAL OBSERVATION PER HR: HCPCS

## 2018-10-12 PROCEDURE — 90661 CCIIV3 VAC ABX FR 0.5 ML IM: CPT | Performed by: UROLOGY

## 2018-10-12 PROCEDURE — 96361 HYDRATE IV INFUSION ADD-ON: CPT

## 2018-10-12 RX ORDER — SULFAMETHOXAZOLE AND TRIMETHOPRIM 800; 160 MG/1; MG/1
1 TABLET ORAL 2 TIMES DAILY
Qty: 28 TABLET | Refills: 0 | OUTPATIENT
Start: 2018-10-12 | End: 2019-10-07

## 2018-10-12 RX ADMIN — CLONAZEPAM 1 MG: 1 TABLET ORAL at 02:09

## 2018-10-12 RX ADMIN — INFLUENZA A VIRUS A/SINGAPORE/GP1908/2015 IVR-180 (H1N1) ANTIGEN (MDCK CELL DERIVED, PROPIOLACTONE INACTIVATED), INFLUENZA A VIRUS A/NORTH CAROLINA/04/2016 (H3N2) HEMAGGLUTININ ANTIGEN (MDCK CELL DERIVED, PROPIOLACTONE INACTIVATED), INFLUENZA B VIRUS B/IOWA/06/2017 HEMAGGLUTININ ANTIGEN (MDCK CELL DERIVED, PROPIOLACTONE INACTIVATED), INFLUENZA B VIRUS B/SINGAPORE/INFTT-16-0610/2016 HEMAGGLUTININ ANTIGEN (MDCK CELL DERIVED, PROPIOLACTONE INACTIVATED) 0.5 ML: 15; 15; 15; 15 INJECTION, SUSPENSION INTRAMUSCULAR at 12:02

## 2018-10-12 RX ADMIN — AMLODIPINE BESYLATE 10 MG: 10 TABLET ORAL at 08:45

## 2018-10-12 RX ADMIN — VENLAFAXINE HYDROCHLORIDE 150 MG: 150 CAPSULE, EXTENDED RELEASE ORAL at 08:45

## 2018-10-12 NOTE — PLAN OF CARE
Problem: Patient Care Overview  Goal: Plan of Care Review  Outcome: Ongoing (interventions implemented as appropriate)   10/12/18 0512   Coping/Psychosocial   Plan of Care Reviewed With patient   Plan of Care Review   Progress improving   OTHER   Outcome Summary Voiding without difficulty. Tolerating PO food and fluids. Rested well at intervals. VSS. Will continue to monitor.     Goal: Individualization and Mutuality  Outcome: Ongoing (interventions implemented as appropriate)    Goal: Discharge Needs Assessment  Outcome: Ongoing (interventions implemented as appropriate)      Problem: Infection, Risk/Actual (Adult)  Goal: Infection Prevention/Resolution  Outcome: Ongoing (interventions implemented as appropriate)

## 2018-10-12 NOTE — H&P
FIRST UROLOGY CONSULT      Patient Identification:  NAME:  Lazarus Roe  Age:  73 y.o.   Sex:  male   :  1945   MRN:  [id number]       Chief complaint: UTI    History of present illness:  Admitted after he experienced high fever and concern for urosepsis. He reports feeling better today.        Past medical history:  Past Medical History:   Diagnosis Date   • Depression    • History of nephrectomy     Left   • Hypertension    • Mitral valve prolapse    • Sepsis (CMS/HCC)    • UTI (urinary tract infection)        Past surgical history:  Past Surgical History:   Procedure Laterality Date   • HERNIA REPAIR     • NEPHRECTOMY Left    • ROTATOR CUFF REPAIR Left    • TONSILLECTOMY         Allergies:  Patient has no known allergies.    Home medications:  Prescriptions Prior to Admission   Medication Sig Dispense Refill Last Dose   • amLODIPine (NORVASC) 10 MG tablet Take 10 mg by mouth Daily.   10/11/2018 at Unknown time   • CLONAZEPAM PO Take 1 mg by mouth At Night As Needed.   10/10/2018 at Unknown time   • venlafaxine XR (EFFEXOR-XR) 150 MG 24 hr capsule Take 150 mg by mouth Daily.   10/11/2018 at Unknown time   • ciprofloxacin (CIPRO) 500 MG tablet Take 1 tablet by mouth 2 (Two) Times a Day. 20 tablet 0         Hospital medications:    amLODIPine 10 mg Oral Q24H   ceftriaxone 1 g Intravenous Q24H   influenza vaccine 0.5 mL Intramuscular Once   venlafaxine  mg Oral Daily With Breakfast       sodium chloride 100 mL/hr Last Rate: 100 mL/hr (10/12/18 1028)     clonazePAM  •  ondansetron    Family history:  No family history on file.    Social history:  Social History   Substance Use Topics   • Smoking status: Former Smoker     Quit date: 10/10/1984   • Smokeless tobacco: Never Used   • Alcohol use 16.8 oz/week     28 Standard drinks or equivalent per week       Review of systems:    Negative 12-system ROS except for the following:  Thigh cramps resolved      Objective:  TMax 24 hours:   Temp  (24hrs), Av.1 °F (36.7 °C), Min:98 °F (36.7 °C), Max:98.3 °F (36.8 °C)      Vitals Ranges:   Temp:  [98 °F (36.7 °C)-98.3 °F (36.8 °C)] 98 °F (36.7 °C)  Heart Rate:  [63-74] 63  Resp:  [18] 18  BP: (126-140)/(73-80) 140/80    Intake/Output Last 3 shifts:  No intake/output data recorded.     Physical Exam:       General Appearance:    Alert, cooperative, in no acute distress   Head:    Normocephalic, without obvious abnormality, atraumatic                                           Skin:   No bleeding, bruising or rashes   Neuro/Psych:   Orientation intact, mood/affect pleasant, no focal findings       Results review:   I reviewed the patient's new clinical results.    Data review:  Lab Results (last 24 hours)     Procedure Component Value Units Date/Time    CBC & Differential [[id number]] Collected:  10/12/18 0420    Specimen:  Blood Updated:  10/12/18 0633    Narrative:       The following orders were created for panel order CBC & Differential.  Procedure                               Abnormality         Status                     ---------                               -----------         ------                     CBC Auto Differential[[id number]]        Abnormal            Final result                 Please view results for these tests on the individual orders.    CBC Auto Differential [[id number]]  (Abnormal) Collected:  10/12/18 0420    Specimen:  Blood Updated:  10/12/18 0633     WBC 7.65 10*3/mm3      RBC 3.63 (L) 10*6/mm3      Hemoglobin 10.9 (L) g/dL      Hematocrit 33.3 (L) %      MCV 91.7 fL      MCH 30.0 pg      MCHC 32.7 g/dL      RDW 12.7 %      RDW-SD 42.7 fl      MPV 9.8 fL      Platelets 270 10*3/mm3      Neutrophil % 67.5 %      Lymphocyte % 14.9 (L) %      Monocyte % 15.2 (H) %      Eosinophil % 2.1 %      Basophil % 0.3 %      Immature Grans % 0.4 %      Neutrophils, Absolute 5.17 10*3/mm3      Lymphocytes, Absolute 1.14 10*3/mm3      Monocytes, Absolute 1.16 10*3/mm3      Eosinophils, Absolute  0.16 10*3/mm3      Basophils, Absolute 0.02 10*3/mm3      Immature Grans, Absolute 0.03 10*3/mm3     Basic Metabolic Panel [[id number]]  (Abnormal) Collected:  10/12/18 0420    Specimen:  Blood Updated:  10/12/18 0554     Glucose 118 (H) mg/dL      BUN 19 mg/dL      Creatinine 0.94 mg/dL      Sodium 133 (L) mmol/L      Potassium 4.0 mmol/L      Chloride 101 mmol/L      CO2 22.4 mmol/L      Calcium 8.8 mg/dL      eGFR Non African Amer 79 mL/min/1.73      BUN/Creatinine Ratio 20.2     Anion Gap 9.6 mmol/L     Narrative:       The MDRD GFR formula is only valid for adults with stable renal function between ages 18 and 70.    Blood Culture - Blood, [[id number]]  (Normal) Collected:  10/11/18 1723    Specimen:  Blood from Arm, Right Updated:  10/12/18 0546     Blood Culture No growth at less than 24 hours    Blood Culture - Blood, [[id number]]  (Normal) Collected:  10/11/18 1729    Specimen:  Blood from Hand, Right Updated:  10/12/18 0546     Blood Culture No growth at less than 24 hours           Imaging:  Imaging Results (last 24 hours)     ** No results found for the last 24 hours. **             Assessment:       UTI (urinary tract infection)        Plan:     - D/C on Bactrim DS x 2 weeks    Catarino Weber Jr., MD  10/12/18  11:54 AM

## 2018-10-15 LAB
BACTERIA SPEC AEROBE CULT: NORMAL
BACTERIA SPEC AEROBE CULT: NORMAL

## 2018-10-16 LAB
BACTERIA SPEC AEROBE CULT: NORMAL
BACTERIA SPEC AEROBE CULT: NORMAL

## 2019-02-01 ENCOUNTER — HOSPITAL ENCOUNTER (EMERGENCY)
Facility: HOSPITAL | Age: 74
Discharge: HOME | End: 2019-02-01
Attending: EMERGENCY MEDICINE | Admitting: EMERGENCY MEDICINE

## 2019-02-01 VITALS
HEART RATE: 71 BPM | RESPIRATION RATE: 15 BRPM | SYSTOLIC BLOOD PRESSURE: 129 MMHG | OXYGEN SATURATION: 99 % | HEIGHT: 70 IN | BODY MASS INDEX: 28.56 KG/M2 | DIASTOLIC BLOOD PRESSURE: 79 MMHG | TEMPERATURE: 98.5 F | WEIGHT: 199.5 LBS

## 2019-02-01 DIAGNOSIS — N39.0 ACUTE UTI: Primary | ICD-10-CM

## 2019-02-01 LAB
ANION GAP SERPL CALCULATED.3IONS-SCNC: 6.9 MMOL/L
BACTERIA UR QL AUTO: ABNORMAL /HPF
BILIRUB UR QL STRIP: NEGATIVE
BUN BLD-MCNC: 17 MG/DL (ref 8–23)
BUN/CREAT SERPL: 16.5 (ref 7–25)
CALCIUM SPEC-SCNC: 9 MG/DL (ref 8.6–10.5)
CHLORIDE SERPL-SCNC: 102 MMOL/L (ref 98–107)
CLARITY UR: ABNORMAL
CO2 SERPL-SCNC: 26.1 MMOL/L (ref 22–29)
COLOR UR: YELLOW
CREAT BLD-MCNC: 1.03 MG/DL (ref 0.76–1.27)
GFR SERPL CREATININE-BSD FRML MDRD: 71 ML/MIN/1.73
GLUCOSE BLD-MCNC: 95 MG/DL (ref 65–99)
GLUCOSE UR STRIP-MCNC: NEGATIVE MG/DL
HGB UR QL STRIP.AUTO: ABNORMAL
HYALINE CASTS UR QL AUTO: ABNORMAL /LPF
KETONES UR QL STRIP: NEGATIVE
LEUKOCYTE ESTERASE UR QL STRIP.AUTO: ABNORMAL
NITRITE UR QL STRIP: NEGATIVE
PH UR STRIP.AUTO: 7.5 [PH] (ref 5–8)
POTASSIUM BLD-SCNC: 4.3 MMOL/L (ref 3.5–5.2)
PROT UR QL STRIP: ABNORMAL
RBC # UR: ABNORMAL /HPF
REF LAB TEST METHOD: ABNORMAL
SODIUM BLD-SCNC: 135 MMOL/L (ref 136–145)
SP GR UR STRIP: 1.02 (ref 1–1.03)
SQUAMOUS #/AREA URNS HPF: ABNORMAL /HPF
UROBILINOGEN UR QL STRIP: ABNORMAL
WBC UR QL AUTO: ABNORMAL /HPF

## 2019-02-01 PROCEDURE — 99284 EMERGENCY DEPT VISIT MOD MDM: CPT

## 2019-02-01 PROCEDURE — 81001 URINALYSIS AUTO W/SCOPE: CPT | Performed by: NURSE PRACTITIONER

## 2019-02-01 PROCEDURE — 80048 BASIC METABOLIC PNL TOTAL CA: CPT | Performed by: NURSE PRACTITIONER

## 2019-02-01 PROCEDURE — 87086 URINE CULTURE/COLONY COUNT: CPT | Performed by: NURSE PRACTITIONER

## 2019-02-01 PROCEDURE — 87088 URINE BACTERIA CULTURE: CPT | Performed by: NURSE PRACTITIONER

## 2019-02-01 PROCEDURE — 51798 US URINE CAPACITY MEASURE: CPT

## 2019-02-01 PROCEDURE — 87186 SC STD MICRODIL/AGAR DIL: CPT | Performed by: NURSE PRACTITIONER

## 2019-02-01 RX ORDER — SULFAMETHOXAZOLE AND TRIMETHOPRIM 800; 160 MG/1; MG/1
1 TABLET ORAL 2 TIMES DAILY
Qty: 20 TABLET | Refills: 0 | Status: SHIPPED | OUTPATIENT
Start: 2019-02-01 | End: 2019-02-11

## 2019-02-01 RX ORDER — TAMSULOSIN HYDROCHLORIDE 0.4 MG/1
1 CAPSULE ORAL NIGHTLY
COMMUNITY
End: 2019-10-07

## 2019-02-01 NOTE — ED NOTES
Pt c/o meeting more resistance when self catheterizing over the past few days. Pt had a cystoscopy on 1/23/19.      Tamara Sumner RN  02/01/19 1044

## 2019-02-01 NOTE — DISCHARGE INSTRUCTIONS
Please practice meticulous handwashing prior to and after self-catherization    Do not re-use your catheters at home    Take antibiotics as prescribed  IF YOU DEVELOP A FEVER    Return Precautions    Although you are being discharged from the ED today, I encourage you to return for worsening symptoms.  Things can, and do, change such that treatment at home with medication may not be adequate.      Specifically, return for any of the following:    Chest pain, shortness of breath, pain or nausea and vomiting not controlled by medications provided.    Please make a follow up with your Primary Care Provider for a blood pressure recheck.

## 2019-02-01 NOTE — ED PROVIDER NOTES
MD ATTESTATION NOTE    The EMILY and I have discussed this patient's history, physical exam, and treatment plan.  I have reviewed the documentation and personally had a face to face interaction with the patient. I affirm the documentation and agree with the treatment and plan.  The attached note describes my personal findings.        Pt has h/o UTIs. Pt states that he is s/p cystoscopy performed about 1.5 weeks ago. Pt reports that he self-catheterizes regularly due to a neurogenic bladder. Pt states that for the past 2 days, pt has noticed that he has been meeting resistance when pt attempts to insert his urinary catheter. At about 13:10 today, after pt inserted his urinary catheter after much resistance and difficulty, pt noticed that he had decreased urinary output into his catheter bag and shortly thereafter, pt's urinary flow through the catheter stopped. Pt denies documented fever, chest pain, dyspnea, N/V/D, and abdominal pain.     Dr. Weber - urologist        On physical exam, pt is alert and oriented x3. Abdomen is soft and nontender.     Pt's bladder was scanned - there are 177 ccs of urine in pt's bladder. Pt's UA suggests UTI. Pt will be prescribed with rx for antibiotics. Pt was strongly advised to f/u with his urologist. Strict RTER warnings given. Pt will be discharged.               Documentation assistance provided by Radha Wilkes. Information recorded by the scribe was done at my direction and has been verified and validated by me.     Entered by Radha Wilkes, acting as scribe for Dr. Janis MD.        Radha Wilkes  02/01/19 9298       Yeyo Bruce MD  02/01/19 7817

## 2019-02-01 NOTE — ED PROVIDER NOTES
"EMERGENCY DEPARTMENT ENCOUNTER    Room Number:  27/27  Date seen:  2/1/2019  Time seen: 2:53 PM  PCP: Kimberlee Nicholas APRN    HPI:  Chief complaint:resistance when cathing  Context:Lazarus Roe is a 73 y.o. male who presents to the ED with c/o decreased urination and resistance that began around 2 days ago. Pt states he intermittent self-caths due to a neurogenic bladder. Pt states he drinks \"lots of fluids\" and that he is \"not getting enough out. He reports keeping charts of data of his intake and output. Pt states he has also been having \"to apply more pressure\" when inserting the catheter. Pt states he currently self-caths 5 times a day and about a month ago was only once/twice a day. Pt states he does not re-use his catheters. Pt states he has had a left nephrectomy. Pt states he is follow by  (urology) and that he was not in office earlier today and was referred to ED. Pt is currently on Flomax. Pt has hx of urinary retention. Pt denies any other sx at this time. Family at bedside.     Timing:constant  Duration: 2 days  Location:bladder  Intensity/Severity:moderate  Associated Symptoms:decreased urination  Previous Episodes:Pt has hx of urinary retention and pt intermittently self-caths.  Treatment before arrival: Pt contacted his urologist and was referred to ED for further evaluation.     MEDICAL RECORD REVIEW  Pt is followed by  (urology).     ALLERGIES  Patient has no known allergies.    PAST MEDICAL HISTORY  Active Ambulatory Problems     Diagnosis Date Noted   • UTI (urinary tract infection) 10/12/2018     Resolved Ambulatory Problems     Diagnosis Date Noted   • No Resolved Ambulatory Problems     Past Medical History:   Diagnosis Date   • Depression    • History of nephrectomy    • Hypertension    • Mitral valve prolapse    • Sepsis (CMS/HCC)    • UTI (urinary tract infection)        PAST SURGICAL HISTORY  Past Surgical History:   Procedure Laterality Date   • HERNIA REPAIR   "   • NEPHRECTOMY Left    • ROTATOR CUFF REPAIR Left    • TONSILLECTOMY         FAMILY HISTORY  History reviewed. No pertinent family history.    SOCIAL HISTORY  Social History     Socioeconomic History   • Marital status:      Spouse name: Not on file   • Number of children: Not on file   • Years of education: Not on file   • Highest education level: Not on file   Social Needs   • Financial resource strain: Not on file   • Food insecurity - worry: Not on file   • Food insecurity - inability: Not on file   • Transportation needs - medical: Not on file   • Transportation needs - non-medical: Not on file   Occupational History   • Not on file   Tobacco Use   • Smoking status: Former Smoker     Last attempt to quit: 10/10/1984     Years since quittin.3   • Smokeless tobacco: Never Used   Substance and Sexual Activity   • Alcohol use: Yes     Alcohol/week: 16.8 oz     Types: 28 Standard drinks or equivalent per week   • Drug use: No   • Sexual activity: Defer   Other Topics Concern   • Not on file   Social History Narrative   • Not on file       REVIEW OF SYSTEMS  Review of Systems   Constitutional: Negative for activity change, appetite change, diaphoresis and fever.   HENT: Negative for trouble swallowing.    Eyes: Negative for visual disturbance.   Respiratory: Negative for cough, chest tightness, shortness of breath and wheezing.    Cardiovascular: Negative for chest pain, palpitations and leg swelling.   Gastrointestinal: Negative for abdominal pain, diarrhea, nausea and vomiting.   Genitourinary: Positive for dysuria (difficulty threading self catheters). Negative for scrotal swelling, testicular pain and urgency.        (+) resistance with self cathing  (+) decreased urination volume   Musculoskeletal: Negative for back pain.   Skin: Negative for rash.   Allergic/Immunologic: Negative for food allergies.   Neurological: Negative for dizziness, speech difficulty and light-headedness.       PHYSICAL  EXAM  ED Triage Vitals   Temp Heart Rate Resp BP SpO2   02/01/19 1434 02/01/19 1434 02/01/19 1434 02/01/19 1450 02/01/19 1434   98.2 °F (36.8 °C) 82 18 147/86 98 %      Temp src Heart Rate Source Patient Position BP Location FiO2 (%)   02/01/19 1434 02/01/19 1450 02/01/19 1450 02/01/19 1450 --   Tympanic Monitor Sitting Right arm      Physical Exam   Constitutional: He is oriented to person, place, and time and well-developed, well-nourished, and in no distress. No distress.   HENT:   Head: Normocephalic and atraumatic.   Eyes: Pupils are equal, round, and reactive to light.   Neck: Normal range of motion. Neck supple.   Cardiovascular: Normal rate, regular rhythm, S1 normal, S2 normal and normal heart sounds. Exam reveals no gallop and no friction rub.   No murmur heard.  Pulmonary/Chest: Effort normal and breath sounds normal. No respiratory distress. He has no decreased breath sounds. He has no wheezes. He has no rales. He exhibits no tenderness.   Abdominal: Soft. Bowel sounds are normal. He exhibits no distension. There is no rebound and no guarding.   Musculoskeletal: Normal range of motion. He exhibits no deformity.   Lymphadenopathy:     He has no cervical adenopathy.   Neurological: He is alert and oriented to person, place, and time.   Skin: Skin is warm and dry.   Psychiatric: Affect normal.   Nursing note and vitals reviewed.      LAB RESULTS  Recent Results (from the past 24 hour(s))   Urinalysis With Culture If Indicated - Urine, Clean Catch    Collection Time: 02/01/19  3:53 PM   Result Value Ref Range    Color, UA Yellow Yellow, Straw    Appearance, UA Cloudy (A) Clear    pH, UA 7.5 5.0 - 8.0    Specific Gravity, UA 1.021 1.005 - 1.030    Glucose, UA Negative Negative    Ketones, UA Negative Negative    Bilirubin, UA Negative Negative    Blood, UA Large (3+) (A) Negative    Protein, UA 30 mg/dL (1+) (A) Negative    Leuk Esterase, UA Large (3+) (A) Negative    Nitrite, UA Negative Negative     Urobilinogen, UA 0.2 E.U./dL 0.2 - 1.0 E.U./dL   Urinalysis, Microscopic Only - Urine, Clean Catch    Collection Time: 02/01/19  3:53 PM   Result Value Ref Range    RBC, UA 31-50 (A) None Seen, 0-2 /HPF    WBC, UA Too Numerous to Count (A) None Seen, 0-2 /HPF    Bacteria, UA 4+ (A) None Seen /HPF    Squamous Epithelial Cells, UA 0-2 None Seen, 0-2 /HPF    Hyaline Casts, UA 0-2 None Seen /LPF    Methodology Automated Microscopy    Basic Metabolic Panel    Collection Time: 02/01/19  3:57 PM   Result Value Ref Range    Glucose 95 65 - 99 mg/dL    BUN 17 8 - 23 mg/dL    Creatinine 1.03 0.76 - 1.27 mg/dL    Sodium 135 (L) 136 - 145 mmol/L    Potassium 4.3 3.5 - 5.2 mmol/L    Chloride 102 98 - 107 mmol/L    CO2 26.1 22.0 - 29.0 mmol/L    Calcium 9.0 8.6 - 10.5 mg/dL    eGFR Non African Amer 71 >60 mL/min/1.73    BUN/Creatinine Ratio 16.5 7.0 - 25.0    Anion Gap 6.9 mmol/L       I ordered the above labs and reviewed the results      COURSE & MEDICAL DECISION MAKING  Pertinent Labs that were ordered and reviewed are noted above.  Results were reviewed/discussed with the patient and they were also made aware of online access.  Pt also made aware that some labs, such as cultures, will not be resulted during ER visit and follow up with PMD is necessary.     PROGRESS AND CONSULTS    Progress Notes:       1507-Bladder scan shows 177mL.     1625-Ordered urine culture for UTI. Pt's prior urine culture was susceptible to Bactrim.     1637-Reviewed pt's history and workup with Dr. Bruce (ED Physician).  After a bedside evaluation, Dr. Bruce agrees with the plan of care. He agrees with the plan to discharge pt with prescription's for Bactrim based off prior urine culture.     1659-Placed call to urology for consult.     1710-Discussed pt case with  (urology) who suggest giving pt a prescription for Bactrim to take if he gets a fever. He does not suggest placing a roberts catheter. He ask for pt to f/u with   "(urology) next week.     1714-Rechecked pt. Pt is resting comfortably. Notified pt of my consult with  (urology). Discussed the plan to prescribe him Bactrim to take if he has a fever. The patient's history, physical exam, and lab findings were discussed with the physician, who also performed a face to face history and physical exam.  I discussed all results and noted any abnormalities with patient.  Discussed absoute need to recheck abnormalities with their family physician.  I answered any of the patient's questions.  Discussed plan for discharge, as there is no emergent indication for admission.  Pt is agreeable and understands need for follow up and repeat testing.  Pt is aware that discharge does not mean that nothing is wrong but it indicates no emergency is present and they must continue care with their family physician.  Pt is discharged with instructions to follow up with primary care doctor to have their blood pressure rechecked.         Disposition vitals:  /86   Pulse 69   Temp 98.2 °F (36.8 °C) (Tympanic)   Resp 16   Ht 177.8 cm (70\")   Wt 90.5 kg (199 lb 8 oz)   SpO2 98%   BMI 28.63 kg/m²       DIAGNOSIS  Final diagnoses:   Acute UTI       FOLLOW UP   Catarino Weber Jr., MD  [address] [place] [address] [place] [id number] [phone]    Schedule an appointment as soon as possible for a visit   to be seen Monday or Tuesday      RX     Medication List      Changed    * sulfamethoxazole-trimethoprim 800-160 MG per tablet  Commonly known as:  BACTRIM DS  Take 1 tablet by mouth 2 (Two) Times a Day.  What changed:  Another medication with the same name was added. Make sure   you understand how and when to take each.     * sulfamethoxazole-trimethoprim 800-160 MG per tablet  Commonly known as:  BACTRIM DS,SEPTRA DS  Take 1 tablet by mouth 2 (Two) Times a Day for 10 days.  What changed:  You were already taking a medication with the same name,   and this prescription was added. Make " sure you understand how and when to   take each.         * This list has 2 medication(s) that are the same as other medications   prescribed for you. Read the directions carefully, and ask your doctor or   other care provider to review them with you.              Documentation assistance provided by vi Maloney for ABDOULAYE Bernard.  Information recorded by the vi was done at my direction and has been verified and validated by me.  Electronically signed by Keri Valdez on 2/1/2019 at time 4:55 PM                 Judy Maloney  02/01/19 1718       Krei Valdez APRN  02/01/19 1722

## 2019-02-03 LAB — BACTERIA SPEC AEROBE CULT: ABNORMAL

## 2019-10-06 ENCOUNTER — HOSPITAL ENCOUNTER (EMERGENCY)
Facility: HOSPITAL | Age: 74
Discharge: HOME | End: 2019-10-06
Attending: EMERGENCY MEDICINE | Admitting: EMERGENCY MEDICINE

## 2019-10-06 VITALS
OXYGEN SATURATION: 97 % | DIASTOLIC BLOOD PRESSURE: 73 MMHG | BODY MASS INDEX: 30.11 KG/M2 | TEMPERATURE: 102 F | SYSTOLIC BLOOD PRESSURE: 120 MMHG | RESPIRATION RATE: 17 BRPM | HEIGHT: 70 IN | WEIGHT: 210.3 LBS | HEART RATE: 109 BPM

## 2019-10-06 DIAGNOSIS — N39.0 COMPLICATED URINARY TRACT INFECTION: Primary | ICD-10-CM

## 2019-10-06 DIAGNOSIS — R50.9 FEVER, UNSPECIFIED: ICD-10-CM

## 2019-10-06 LAB
ALBUMIN SERPL-MCNC: 4.2 G/DL (ref 3.5–5.2)
ALBUMIN/GLOB SERPL: 1.3 G/DL
ALP SERPL-CCNC: 78 U/L (ref 39–117)
ALT SERPL W P-5'-P-CCNC: 18 U/L (ref 1–41)
ANION GAP SERPL CALCULATED.3IONS-SCNC: 12.6 MMOL/L (ref 5–15)
AST SERPL-CCNC: 21 U/L (ref 1–40)
BACTERIA UR QL AUTO: ABNORMAL /HPF
BASOPHILS # BLD AUTO: 0.02 10*3/MM3 (ref 0–0.2)
BASOPHILS NFR BLD AUTO: 0.2 % (ref 0–1.5)
BILIRUB SERPL-MCNC: 0.8 MG/DL (ref 0.2–1.2)
BILIRUB UR QL STRIP: NEGATIVE
BUN BLD-MCNC: 15 MG/DL (ref 8–23)
BUN/CREAT SERPL: 13.8 (ref 7–25)
CALCIUM SPEC-SCNC: 8.7 MG/DL (ref 8.6–10.5)
CHLORIDE SERPL-SCNC: 94 MMOL/L (ref 98–107)
CLARITY UR: CLEAR
CO2 SERPL-SCNC: 23.4 MMOL/L (ref 22–29)
COLOR UR: YELLOW
CREAT BLD-MCNC: 1.09 MG/DL (ref 0.76–1.27)
D-LACTATE SERPL-SCNC: 0.9 MMOL/L (ref 0.5–2)
DEPRECATED RDW RBC AUTO: 40.9 FL (ref 37–54)
EOSINOPHIL # BLD AUTO: 0.05 10*3/MM3 (ref 0–0.4)
EOSINOPHIL NFR BLD AUTO: 0.6 % (ref 0.3–6.2)
ERYTHROCYTE [DISTWIDTH] IN BLOOD BY AUTOMATED COUNT: 12.5 % (ref 12.3–15.4)
GFR SERPL CREATININE-BSD FRML MDRD: 66 ML/MIN/1.73
GLOBULIN UR ELPH-MCNC: 3.3 GM/DL
GLUCOSE BLD-MCNC: 139 MG/DL (ref 65–99)
GLUCOSE UR STRIP-MCNC: NEGATIVE MG/DL
HCT VFR BLD AUTO: 44.6 % (ref 37.5–51)
HGB BLD-MCNC: 15 G/DL (ref 13–17.7)
HGB UR QL STRIP.AUTO: ABNORMAL
HOLD SPECIMEN: NORMAL
HOLD SPECIMEN: NORMAL
HYALINE CASTS UR QL AUTO: ABNORMAL /LPF
IMM GRANULOCYTES # BLD AUTO: 0.04 10*3/MM3 (ref 0–0.05)
IMM GRANULOCYTES NFR BLD AUTO: 0.5 % (ref 0–0.5)
INR PPP: 1.16 (ref 0.9–1.1)
KETONES UR QL STRIP: NEGATIVE
LEUKOCYTE ESTERASE UR QL STRIP.AUTO: ABNORMAL
LYMPHOCYTES # BLD AUTO: 0.38 10*3/MM3 (ref 0.7–3.1)
LYMPHOCYTES NFR BLD AUTO: 4.5 % (ref 19.6–45.3)
MCH RBC QN AUTO: 30.7 PG (ref 26.6–33)
MCHC RBC AUTO-ENTMCNC: 33.6 G/DL (ref 31.5–35.7)
MCV RBC AUTO: 91.2 FL (ref 79–97)
MONOCYTES # BLD AUTO: 0.52 10*3/MM3 (ref 0.1–0.9)
MONOCYTES NFR BLD AUTO: 6.2 % (ref 5–12)
NEUTROPHILS # BLD AUTO: 7.36 10*3/MM3 (ref 1.7–7)
NEUTROPHILS NFR BLD AUTO: 88 % (ref 42.7–76)
NITRITE UR QL STRIP: POSITIVE
NRBC BLD AUTO-RTO: 0 /100 WBC (ref 0–0.2)
PH UR STRIP.AUTO: 8.5 [PH] (ref 5–8)
PLATELET # BLD AUTO: 188 10*3/MM3 (ref 140–450)
PMV BLD AUTO: 9.7 FL (ref 6–12)
POTASSIUM BLD-SCNC: 4.3 MMOL/L (ref 3.5–5.2)
PROT SERPL-MCNC: 7.5 G/DL (ref 6–8.5)
PROT UR QL STRIP: ABNORMAL
PROTHROMBIN TIME: 14.5 SECONDS (ref 11.7–14.2)
RBC # BLD AUTO: 4.89 10*6/MM3 (ref 4.14–5.8)
RBC # UR: ABNORMAL /HPF
REF LAB TEST METHOD: ABNORMAL
SODIUM BLD-SCNC: 130 MMOL/L (ref 136–145)
SP GR UR STRIP: 1.02 (ref 1–1.03)
SQUAMOUS #/AREA URNS HPF: ABNORMAL /HPF
UROBILINOGEN UR QL STRIP: ABNORMAL
WBC NRBC COR # BLD: 8.37 10*3/MM3 (ref 3.4–10.8)
WBC UR QL AUTO: ABNORMAL /HPF
WHOLE BLOOD HOLD SPECIMEN: NORMAL
WHOLE BLOOD HOLD SPECIMEN: NORMAL

## 2019-10-06 PROCEDURE — 83605 ASSAY OF LACTIC ACID: CPT | Performed by: EMERGENCY MEDICINE

## 2019-10-06 PROCEDURE — 99283 EMERGENCY DEPT VISIT LOW MDM: CPT

## 2019-10-06 PROCEDURE — 87086 URINE CULTURE/COLONY COUNT: CPT | Performed by: EMERGENCY MEDICINE

## 2019-10-06 PROCEDURE — 87077 CULTURE AEROBIC IDENTIFY: CPT | Performed by: EMERGENCY MEDICINE

## 2019-10-06 PROCEDURE — 81001 URINALYSIS AUTO W/SCOPE: CPT | Performed by: EMERGENCY MEDICINE

## 2019-10-06 PROCEDURE — 87150 DNA/RNA AMPLIFIED PROBE: CPT | Performed by: EMERGENCY MEDICINE

## 2019-10-06 PROCEDURE — 96365 THER/PROPH/DIAG IV INF INIT: CPT

## 2019-10-06 PROCEDURE — 25010000002 CEFTRIAXONE PER 250 MG: Performed by: EMERGENCY MEDICINE

## 2019-10-06 PROCEDURE — 87040 BLOOD CULTURE FOR BACTERIA: CPT | Performed by: EMERGENCY MEDICINE

## 2019-10-06 PROCEDURE — 85025 COMPLETE CBC W/AUTO DIFF WBC: CPT | Performed by: EMERGENCY MEDICINE

## 2019-10-06 PROCEDURE — 85610 PROTHROMBIN TIME: CPT | Performed by: EMERGENCY MEDICINE

## 2019-10-06 PROCEDURE — 87186 SC STD MICRODIL/AGAR DIL: CPT | Performed by: EMERGENCY MEDICINE

## 2019-10-06 PROCEDURE — 80053 COMPREHEN METABOLIC PANEL: CPT | Performed by: EMERGENCY MEDICINE

## 2019-10-06 PROCEDURE — P9612 CATHETERIZE FOR URINE SPEC: HCPCS

## 2019-10-06 RX ORDER — CEFTRIAXONE SODIUM 1 G/50ML
1 INJECTION, SOLUTION INTRAVENOUS ONCE
Status: COMPLETED | OUTPATIENT
Start: 2019-10-06 | End: 2019-10-06

## 2019-10-06 RX ORDER — CEPHALEXIN 500 MG/1
500 CAPSULE ORAL 3 TIMES DAILY
Qty: 21 CAPSULE | Refills: 0 | Status: SHIPPED | OUTPATIENT
Start: 2019-10-06 | End: 2019-10-09 | Stop reason: HOSPADM

## 2019-10-06 RX ORDER — ACETAMINOPHEN 500 MG
1000 TABLET ORAL ONCE
Status: COMPLETED | OUTPATIENT
Start: 2019-10-06 | End: 2019-10-06

## 2019-10-06 RX ORDER — SODIUM CHLORIDE 0.9 % (FLUSH) 0.9 %
10 SYRINGE (ML) INJECTION AS NEEDED
Status: DISCONTINUED | OUTPATIENT
Start: 2019-10-06 | End: 2019-10-06 | Stop reason: HOSPADM

## 2019-10-06 RX ADMIN — CEFTRIAXONE SODIUM 1 G: 1 INJECTION, SOLUTION INTRAVENOUS at 15:13

## 2019-10-06 RX ADMIN — ACETAMINOPHEN 1000 MG: 500 TABLET ORAL at 15:13

## 2019-10-06 RX ADMIN — SODIUM CHLORIDE 1000 ML: 9 INJECTION, SOLUTION INTRAVENOUS at 14:02

## 2019-10-06 NOTE — ED PROVIDER NOTES
EMERGENCY DEPARTMENT ENCOUNTER    Room Number:    Date of encounter:  10/6/2019  PCP: Kimberlee Nicholas APRN  Historian: Patient, wife      HPI:  Chief Complaint: Fever and chills  A complete HPI/ROS/PMH/PSH/SH/FH are unobtainable due to: None    Context: Lazarus Roe is a 74 y.o. male who presents to the ED c/o fever and chills onset Friday.  T-max at home 103.  Chills and body aches worsened today.  Body aches described as moderate.  Worsened by nothing, improved by nothing.  Patient does self cath and had slight difficulty on Friday and did note a small amount of blood in the urine.  He is cath without difficulty since that time.      PAST MEDICAL HISTORY  Active Ambulatory Problems     Diagnosis Date Noted   • UTI (urinary tract infection) 10/12/2018     Resolved Ambulatory Problems     Diagnosis Date Noted   • No Resolved Ambulatory Problems     Past Medical History:   Diagnosis Date   • Depression    • History of nephrectomy    • Hypertension    • Mitral valve prolapse    • Sepsis (CMS/HCC)    • UTI (urinary tract infection)          PAST SURGICAL HISTORY  Past Surgical History:   Procedure Laterality Date   • HERNIA REPAIR     • NEPHRECTOMY Left    • ROTATOR CUFF REPAIR Left    • TONSILLECTOMY           FAMILY HISTORY  No family history on file.      SOCIAL HISTORY  Social History     Socioeconomic History   • Marital status:      Spouse name: Not on file   • Number of children: Not on file   • Years of education: Not on file   • Highest education level: Not on file   Tobacco Use   • Smoking status: Former Smoker     Last attempt to quit: 10/10/1984     Years since quittin.0   • Smokeless tobacco: Never Used   Substance and Sexual Activity   • Alcohol use: Yes     Alcohol/week: 16.8 oz     Types: 28 Standard drinks or equivalent per week   • Drug use: No   • Sexual activity: Defer         ALLERGIES  Patient has no known allergies.        REVIEW OF SYSTEMS  Review of Systems    Constitutional: Positive for chills and fever.   HENT: Negative.  Negative for sore throat.    Eyes: Negative.    Respiratory: Negative.  Negative for cough and shortness of breath.    Cardiovascular: Positive for leg swelling (Intermittent for some weeks, worsened when on his feet.). Negative for chest pain.   Gastrointestinal: Negative.  Negative for diarrhea, nausea and vomiting.   Genitourinary: Positive for testicular pain. Negative for dysuria.        Patient self caths daily.  Patient does report some groin/testicular pain.   Musculoskeletal: Negative.  Negative for back pain.        Generalized body aches   Skin: Negative.  Negative for rash.   Neurological: Negative.  Negative for headaches.   All other systems reviewed and are negative.       PHYSICAL EXAM    I have reviewed the triage vital signs and nursing notes.    ED Triage Vitals   Temp Heart Rate Resp BP SpO2   10/06/19 1311 10/06/19 1311 10/06/19 1311 10/06/19 1320 10/06/19 1311   (!) 104.2 °F (40.1 °C) (!) 132 18 149/88 96 %      Temp src Heart Rate Source Patient Position BP Location FiO2 (%)   10/06/19 1332 10/06/19 1332 -- -- --   Oral Monitor          Physical Exam  GENERAL: not distressed, nontoxic appearance  HENT: nares patent  EYES: no scleral icterus  CV: regular rhythm, tachycardia  RESPIRATORY: normal effort  ABDOMEN: soft  MUSCULOSKELETAL: no deformity  NEURO: alert, moves all extremities, follows commands  SKIN: warm, dry, no noted rash        LAB RESULTS  Recent Results (from the past 24 hour(s))   Green Top (Gel)    Collection Time: 10/06/19  1:38 PM   Result Value Ref Range    Extra Tube Hold for add-ons.    Lavender Top    Collection Time: 10/06/19  1:38 PM   Result Value Ref Range    Extra Tube hold for add-on    Gold Top - SST    Collection Time: 10/06/19  1:38 PM   Result Value Ref Range    Extra Tube Hold for add-ons.    Comprehensive Metabolic Panel    Collection Time: 10/06/19  1:38 PM   Result Value Ref Range    Glucose  139 (H) 65 - 99 mg/dL    BUN 15 8 - 23 mg/dL    Creatinine 1.09 0.76 - 1.27 mg/dL    Sodium 130 (L) 136 - 145 mmol/L    Potassium 4.3 3.5 - 5.2 mmol/L    Chloride 94 (L) 98 - 107 mmol/L    CO2 23.4 22.0 - 29.0 mmol/L    Calcium 8.7 8.6 - 10.5 mg/dL    Total Protein 7.5 6.0 - 8.5 g/dL    Albumin 4.20 3.50 - 5.20 g/dL    ALT (SGPT) 18 1 - 41 U/L    AST (SGOT) 21 1 - 40 U/L    Alkaline Phosphatase 78 39 - 117 U/L    Total Bilirubin 0.8 0.2 - 1.2 mg/dL    eGFR Non African Amer 66 >60 mL/min/1.73    Globulin 3.3 gm/dL    A/G Ratio 1.3 g/dL    BUN/Creatinine Ratio 13.8 7.0 - 25.0    Anion Gap 12.6 5.0 - 15.0 mmol/L   CBC Auto Differential    Collection Time: 10/06/19  1:38 PM   Result Value Ref Range    WBC 8.37 3.40 - 10.80 10*3/mm3    RBC 4.89 4.14 - 5.80 10*6/mm3    Hemoglobin 15.0 13.0 - 17.7 g/dL    Hematocrit 44.6 37.5 - 51.0 %    MCV 91.2 79.0 - 97.0 fL    MCH 30.7 26.6 - 33.0 pg    MCHC 33.6 31.5 - 35.7 g/dL    RDW 12.5 12.3 - 15.4 %    RDW-SD 40.9 37.0 - 54.0 fl    MPV 9.7 6.0 - 12.0 fL    Platelets 188 140 - 450 10*3/mm3    Neutrophil % 88.0 (H) 42.7 - 76.0 %    Lymphocyte % 4.5 (L) 19.6 - 45.3 %    Monocyte % 6.2 5.0 - 12.0 %    Eosinophil % 0.6 0.3 - 6.2 %    Basophil % 0.2 0.0 - 1.5 %    Immature Grans % 0.5 0.0 - 0.5 %    Neutrophils, Absolute 7.36 (H) 1.70 - 7.00 10*3/mm3    Lymphocytes, Absolute 0.38 (L) 0.70 - 3.10 10*3/mm3    Monocytes, Absolute 0.52 0.10 - 0.90 10*3/mm3    Eosinophils, Absolute 0.05 0.00 - 0.40 10*3/mm3    Basophils, Absolute 0.02 0.00 - 0.20 10*3/mm3    Immature Grans, Absolute 0.04 0.00 - 0.05 10*3/mm3    nRBC 0.0 0.0 - 0.2 /100 WBC   Light Blue Top    Collection Time: 10/06/19  1:39 PM   Result Value Ref Range    Extra Tube hold for add-on    Protime-INR    Collection Time: 10/06/19  1:39 PM   Result Value Ref Range    Protime 14.5 (H) 11.7 - 14.2 Seconds    INR 1.16 (H) 0.90 - 1.10   Lactic Acid, Plasma    Collection Time: 10/06/19  2:00 PM   Result Value Ref Range    Lactate 0.9  0.5 - 2.0 mmol/L   Urinalysis With Culture If Indicated - Urine, Catheter In/Out    Collection Time: 10/06/19  2:16 PM   Result Value Ref Range    Color, UA Yellow Yellow, Straw    Appearance, UA Clear Clear    pH, UA 8.5 (H) 5.0 - 8.0    Specific Gravity, UA 1.017 1.005 - 1.030    Glucose, UA Negative Negative    Ketones, UA Negative Negative    Bilirubin, UA Negative Negative    Blood, UA Small (1+) (A) Negative    Protein,  mg/dL (2+) (A) Negative    Leuk Esterase, UA Small (1+) (A) Negative    Nitrite, UA Positive (A) Negative    Urobilinogen, UA 0.2 E.U./dL 0.2 - 1.0 E.U./dL   Urinalysis, Microscopic Only - Urine, Catheter In/Out    Collection Time: 10/06/19  2:16 PM   Result Value Ref Range    RBC, UA 6-12 (A) None Seen, 0-2 /HPF    WBC, UA 21-30 (A) None Seen, 0-2 /HPF    Bacteria, UA 4+ (A) None Seen /HPF    Squamous Epithelial Cells, UA 0-2 None Seen, 0-2 /HPF    Hyaline Casts, UA 0-2 None Seen /LPF    Methodology Automated Microscopy        Ordered the above labs and independently reviewed the results.        RADIOLOGY  No Radiology Exams Resulted Within Past 24 Hours    I ordered the above noted radiological studies. Reviewed by me and discussed with radiologist.  See dictation for official radiology interpretation.      PROCEDURES    Procedures      MEDICATIONS GIVEN IN ER    Medications   sodium chloride 0.9 % flush 10 mL (not administered)   acetaminophen (TYLENOL) tablet 1,000 mg (0 mg Oral Hold 10/6/19 1410)   cefTRIAXone (ROCEPHIN) IVPB 1 g (not administered)   sodium chloride 0.9 % bolus 1,000 mL (1,000 mL Intravenous New Bag 10/6/19 1402)         PROGRESS, DATA ANALYSIS, CONSULTS, AND MEDICAL DECISION MAKING    All labs have been independently reviewed by me.  All radiology studies have been reviewed by me and discussed with radiologist dictating the report.   EKG's independently viewed and interpreted by me.  Discussion below represents my analysis of pertinent findings related to patient's  condition, differential diagnosis, treatment plan and final disposition.      ED Course as of Oct 06 1506   Sun Oct 06, 2019   3486 74-year-old diabetic male with a history of self cathing presents with generalized body aches and fever to 104.2.  Etiology is unclear at this point but I would lean to likely urinary tract infection.  [DB]   3170 I discussed results of the test with patient and wife at the bedside.  I offered admission to the hospital as he had a fever as high as 104.  Patient is feeling much better and is very anxious to go home.  Wife is comfortable with this plan as well.  I will go ahead and give IV Rocephin.  I have cultured his blood and have cultured his urine.  He does have an appointment to see Dr. Weber tomorrow.  [DB]      ED Course User Index  [DB] Luis Snyder MD       AS OF 3:06 PM VITALS:    BP - 149/88  HR - 118  TEMP - (!) 101.8 °F (38.8 °C) (Oral)  02 SATS - 96%        DIAGNOSIS  Final diagnoses:   Complicated urinary tract infection   Fever, unspecified         DISPOSITION  DISCHARGE    Patient discharged in stable condition.    Reviewed implications of results, diagnosis, meds, responsibility to follow up, warning signs and symptoms of possible worsening, potential complications and reasons to return to ER, including increased abdominal pain, shortness of air or as needed.    Patient/Family voiced understanding of above instructions.    Discussed plan for discharge, as there is no emergent indication for admission. Patient referred to primary care provider for BP management due to today's BP. Pt/family is agreeable and understands need for follow up and repeat testing.  Pt is aware that discharge does not mean that nothing is wrong but it indicates no emergency is present that requires admission and they must continue care with follow-up as given below or physician of their choice.     FOLLOW-UP  Kimberlee Nicholas, APRN  [address] [facility] [address]  [facility]  [id number]  [phone]    In 2 days  If Not Better    Catarino Weber Jr., MD  [address] [place] [address] [place] [id number]  [phone]    In 1 day           Medication List      New Prescriptions    cephalexin 500 MG capsule  Commonly known as:  KEFLEX  Take 1 capsule by mouth 3 (Three) Times a Day.                   Luis Snyder MD  10/06/19 1505       Luis Snyder MD  10/06/19 1507

## 2019-10-07 ENCOUNTER — HOSPITAL ENCOUNTER (INPATIENT)
Facility: HOSPITAL | Age: 74
LOS: 2 days | Discharge: HOME | End: 2019-10-09
Attending: EMERGENCY MEDICINE | Admitting: INTERNAL MEDICINE

## 2019-10-07 DIAGNOSIS — N39.0 COMPLICATED URINARY TRACT INFECTION: ICD-10-CM

## 2019-10-07 DIAGNOSIS — R78.81 GRAM-NEGATIVE BACTEREMIA: Primary | ICD-10-CM

## 2019-10-07 PROBLEM — E87.1 HYPONATREMIA: Status: ACTIVE | Noted: 2019-10-07

## 2019-10-07 PROBLEM — N31.9 NEUROGENIC BLADDER: Status: ACTIVE | Noted: 2019-10-07

## 2019-10-07 PROBLEM — I10 HTN (HYPERTENSION): Status: ACTIVE | Noted: 2019-10-07

## 2019-10-07 PROBLEM — N13.8 BPH WITH OBSTRUCTION/LOWER URINARY TRACT SYMPTOMS: Status: ACTIVE | Noted: 2019-10-07

## 2019-10-07 PROBLEM — N40.1 BPH WITH OBSTRUCTION/LOWER URINARY TRACT SYMPTOMS: Status: ACTIVE | Noted: 2019-10-07

## 2019-10-07 PROBLEM — Z90.5 H/O LEFT NEPHRECTOMY: Status: ACTIVE | Noted: 2019-10-07

## 2019-10-07 LAB
ANION GAP SERPL CALCULATED.3IONS-SCNC: 11.1 MMOL/L (ref 5–15)
BACTERIA BLD CULT: ABNORMAL
BASOPHILS # BLD AUTO: 0.02 10*3/MM3 (ref 0–0.2)
BASOPHILS NFR BLD AUTO: 0.3 % (ref 0–1.5)
BUN BLD-MCNC: 18 MG/DL (ref 8–23)
BUN/CREAT SERPL: 17.6 (ref 7–25)
CALCIUM SPEC-SCNC: 8.6 MG/DL (ref 8.6–10.5)
CHLORIDE SERPL-SCNC: 95 MMOL/L (ref 98–107)
CO2 SERPL-SCNC: 23.9 MMOL/L (ref 22–29)
CREAT BLD-MCNC: 1.02 MG/DL (ref 0.76–1.27)
D-LACTATE SERPL-SCNC: 0.6 MMOL/L (ref 0.5–2)
DEPRECATED RDW RBC AUTO: 43 FL (ref 37–54)
EOSINOPHIL # BLD AUTO: 0.03 10*3/MM3 (ref 0–0.4)
EOSINOPHIL NFR BLD AUTO: 0.4 % (ref 0.3–6.2)
ERYTHROCYTE [DISTWIDTH] IN BLOOD BY AUTOMATED COUNT: 12.7 % (ref 12.3–15.4)
GFR SERPL CREATININE-BSD FRML MDRD: 72 ML/MIN/1.73
GLUCOSE BLD-MCNC: 143 MG/DL (ref 65–99)
HCT VFR BLD AUTO: 39 % (ref 37.5–51)
HGB BLD-MCNC: 13.2 G/DL (ref 13–17.7)
IMM GRANULOCYTES # BLD AUTO: 0.04 10*3/MM3 (ref 0–0.05)
IMM GRANULOCYTES NFR BLD AUTO: 0.5 % (ref 0–0.5)
LYMPHOCYTES # BLD AUTO: 0.54 10*3/MM3 (ref 0.7–3.1)
LYMPHOCYTES NFR BLD AUTO: 7.1 % (ref 19.6–45.3)
MCH RBC QN AUTO: 31.4 PG (ref 26.6–33)
MCHC RBC AUTO-ENTMCNC: 33.8 G/DL (ref 31.5–35.7)
MCV RBC AUTO: 92.9 FL (ref 79–97)
MONOCYTES # BLD AUTO: 1.36 10*3/MM3 (ref 0.1–0.9)
MONOCYTES NFR BLD AUTO: 17.9 % (ref 5–12)
NEUTROPHILS # BLD AUTO: 5.62 10*3/MM3 (ref 1.7–7)
NEUTROPHILS NFR BLD AUTO: 73.8 % (ref 42.7–76)
NRBC BLD AUTO-RTO: 0 /100 WBC (ref 0–0.2)
PLATELET # BLD AUTO: 153 10*3/MM3 (ref 140–450)
PMV BLD AUTO: 9.6 FL (ref 6–12)
POTASSIUM BLD-SCNC: 4.1 MMOL/L (ref 3.5–5.2)
RBC # BLD AUTO: 4.2 10*6/MM3 (ref 4.14–5.8)
SODIUM BLD-SCNC: 130 MMOL/L (ref 136–145)
WBC NRBC COR # BLD: 7.61 10*3/MM3 (ref 3.4–10.8)

## 2019-10-07 PROCEDURE — 85025 COMPLETE CBC W/AUTO DIFF WBC: CPT | Performed by: EMERGENCY MEDICINE

## 2019-10-07 PROCEDURE — 25010000003 CEFTRIAXONE PER 250 MG: Performed by: EMERGENCY MEDICINE

## 2019-10-07 PROCEDURE — 99284 EMERGENCY DEPT VISIT MOD MDM: CPT

## 2019-10-07 PROCEDURE — 83605 ASSAY OF LACTIC ACID: CPT | Performed by: EMERGENCY MEDICINE

## 2019-10-07 PROCEDURE — 80048 BASIC METABOLIC PNL TOTAL CA: CPT | Performed by: EMERGENCY MEDICINE

## 2019-10-07 RX ORDER — SODIUM CHLORIDE 9 MG/ML
125 INJECTION, SOLUTION INTRAVENOUS CONTINUOUS
Status: ACTIVE | OUTPATIENT
Start: 2019-10-07 | End: 2019-10-07

## 2019-10-07 RX ORDER — AMLODIPINE BESYLATE 10 MG/1
10 TABLET ORAL DAILY
Status: DISCONTINUED | OUTPATIENT
Start: 2019-10-08 | End: 2019-10-09 | Stop reason: HOSPADM

## 2019-10-07 RX ORDER — VENLAFAXINE HYDROCHLORIDE 150 MG/1
150 CAPSULE, EXTENDED RELEASE ORAL DAILY
Status: DISCONTINUED | OUTPATIENT
Start: 2019-10-08 | End: 2019-10-09 | Stop reason: HOSPADM

## 2019-10-07 RX ORDER — CLONAZEPAM 1 MG/1
1 TABLET ORAL 2 TIMES DAILY PRN
Status: DISCONTINUED | OUTPATIENT
Start: 2019-10-07 | End: 2019-10-09 | Stop reason: HOSPADM

## 2019-10-07 RX ORDER — ACETAMINOPHEN 650 MG/1
650 SUPPOSITORY RECTAL EVERY 4 HOURS PRN
Status: DISCONTINUED | OUTPATIENT
Start: 2019-10-07 | End: 2019-10-09 | Stop reason: HOSPADM

## 2019-10-07 RX ORDER — CEFTRIAXONE SODIUM 1 G/50ML
1 INJECTION, SOLUTION INTRAVENOUS ONCE
Status: DISCONTINUED | OUTPATIENT
Start: 2019-10-07 | End: 2019-10-07

## 2019-10-07 RX ORDER — ACETAMINOPHEN 325 MG/1
650 TABLET ORAL EVERY 4 HOURS PRN
Status: DISCONTINUED | OUTPATIENT
Start: 2019-10-07 | End: 2019-10-09 | Stop reason: HOSPADM

## 2019-10-07 RX ORDER — SODIUM CHLORIDE, SODIUM LACTATE, POTASSIUM CHLORIDE, CALCIUM CHLORIDE 600; 310; 30; 20 MG/100ML; MG/100ML; MG/100ML; MG/100ML
100 INJECTION, SOLUTION INTRAVENOUS CONTINUOUS
Status: DISCONTINUED | OUTPATIENT
Start: 2019-10-07 | End: 2019-10-07

## 2019-10-07 RX ORDER — ACETAMINOPHEN 160 MG/5ML
650 SOLUTION ORAL EVERY 4 HOURS PRN
Status: DISCONTINUED | OUTPATIENT
Start: 2019-10-07 | End: 2019-10-09 | Stop reason: HOSPADM

## 2019-10-07 RX ORDER — ONDANSETRON 2 MG/ML
4 INJECTION INTRAMUSCULAR; INTRAVENOUS EVERY 6 HOURS PRN
Status: DISCONTINUED | OUTPATIENT
Start: 2019-10-07 | End: 2019-10-09 | Stop reason: HOSPADM

## 2019-10-07 RX ORDER — CEFTRIAXONE SODIUM 2 G/50ML
2 INJECTION, SOLUTION INTRAVENOUS EVERY 24 HOURS
Status: DISCONTINUED | OUTPATIENT
Start: 2019-10-08 | End: 2019-10-08

## 2019-10-07 RX ORDER — SODIUM CHLORIDE 0.9 % (FLUSH) 0.9 %
10 SYRINGE (ML) INJECTION EVERY 12 HOURS SCHEDULED
Status: DISCONTINUED | OUTPATIENT
Start: 2019-10-07 | End: 2019-10-09 | Stop reason: HOSPADM

## 2019-10-07 RX ORDER — SODIUM CHLORIDE 0.9 % (FLUSH) 0.9 %
10 SYRINGE (ML) INJECTION AS NEEDED
Status: DISCONTINUED | OUTPATIENT
Start: 2019-10-07 | End: 2019-10-09 | Stop reason: HOSPADM

## 2019-10-07 RX ORDER — CEFTRIAXONE SODIUM 2 G/50ML
2 INJECTION, SOLUTION INTRAVENOUS ONCE
Status: COMPLETED | OUTPATIENT
Start: 2019-10-07 | End: 2019-10-07

## 2019-10-07 RX ADMIN — CEFTRIAXONE SODIUM 2 G: 2 INJECTION, SOLUTION INTRAVENOUS at 09:49

## 2019-10-07 RX ADMIN — SODIUM CHLORIDE, PRESERVATIVE FREE 10 ML: 5 INJECTION INTRAVENOUS at 20:04

## 2019-10-07 RX ADMIN — SODIUM CHLORIDE, PRESERVATIVE FREE 10 ML: 5 INJECTION INTRAVENOUS at 17:37

## 2019-10-07 RX ADMIN — SODIUM CHLORIDE 125 ML/HR: 9 INJECTION, SOLUTION INTRAVENOUS at 17:36

## 2019-10-07 RX ADMIN — CLONAZEPAM 1 MG: 1 TABLET ORAL at 20:04

## 2019-10-07 RX ADMIN — SODIUM CHLORIDE, POTASSIUM CHLORIDE, SODIUM LACTATE AND CALCIUM CHLORIDE 100 ML/HR: 600; 310; 30; 20 INJECTION, SOLUTION INTRAVENOUS at 12:06

## 2019-10-07 NOTE — H&P
Patient Name:  Lazarus Roe  YOB: 1945  MRN:  [id number]  Admit Date:  10/7/2019  Patient Care Team:  Kimberlee Nicholas APRN as PCP - General (Nurse Practitioner)      Subjective   History Present Illness     Chief Complaint   Patient presents with   • Fever       Mr. Roe is a 74 y.o. with a history of, neurogenic bladder (, left nephrectomy, hypertension and MVP that presents back to [facility] due to positive blood cultures.  Patient was seen in ED yesterday, diagnosed with UTI and was given ceftriaxone and Keflex.  He was DC'd on p.o. antibiotics and was to follow-up with urology.  Blood cultures came back positive for gram-negative bacilli and he was instructed to come back into the ED for IV antibiotics.  He reports subjective fevers and chills, suprapubic and flank pain.  Denies chest pain, palpitations, SOA, nausea and vomiting.    Review of Systems   Constitutional: Positive for chills and fever.   HENT: Negative for congestion and dental problem.    Eyes: Negative.    Respiratory: Negative for cough and shortness of breath.    Cardiovascular: Negative.    Gastrointestinal: Positive for abdominal pain. Negative for abdominal distention, nausea and vomiting.   Endocrine: Negative.    Genitourinary: Positive for difficulty urinating and flank pain.   Musculoskeletal: Negative for back pain and gait problem.   Skin: Negative.    Allergic/Immunologic: Negative for environmental allergies and food allergies.   Neurological: Negative for dizziness and headaches.   Hematological: Negative.    Psychiatric/Behavioral: Negative for agitation and behavioral problems.        Personal History     Past Medical History:   Diagnosis Date   • BPH with obstruction/lower urinary tract symptoms 10/7/2019   • Depression    • H/O left nephrectomy 10/7/2019   • History of nephrectomy     Left   • HTN (hypertension) 10/7/2019   • Hypertension    • Hyponatremia 10/7/2019   • Mitral  valve prolapse    • Neurogenic bladder 10/7/2019   • Renal mass    • Self-catheterizes urinary bladder    • Sepsis (CMS/HCC)    • UTI (urinary tract infection)      Past Surgical History:   Procedure Laterality Date   • HERNIA REPAIR     • NEPHRECTOMY Left    • ROTATOR CUFF REPAIR Left    • TONSILLECTOMY     • TURP / TRANSURETHRAL INCISION / DRAINAGE PROSTATE       History reviewed. No pertinent family history.  Social History     Tobacco Use   • Smoking status: Former Smoker     Last attempt to quit: 10/10/1984     Years since quittin.0   • Smokeless tobacco: Never Used   Substance Use Topics   • Alcohol use: Yes     Alcohol/week: 16.8 oz     Types: 28 Standard drinks or equivalent per week   • Drug use: No     Current Facility-Administered Medications on File Prior to Encounter   Medication Dose Route Frequency Provider Last Rate Last Dose   • [COMPLETED] acetaminophen (TYLENOL) tablet 1,000 mg  1,000 mg Oral Once [place], Luis DENT MD   1,000 mg at 10/06/19 1513   • [COMPLETED] cefTRIAXone (ROCEPHIN) IVPB 1 g  1 g Intravenous Once Lillian, Luis DENT MD   Stopped at 10/06/19 1601   • [COMPLETED] sodium chloride 0.9 % bolus 1,000 mL  1,000 mL Intravenous Once LittletonLuis fitch MD   Stopped at 10/06/19 1551   • [DISCONTINUED] sodium chloride 0.9 % flush 10 mL  10 mL Intravenous PRN LillianLuis fitch MD         Current Outpatient Medications on File Prior to Encounter   Medication Sig Dispense Refill   • amLODIPine (NORVASC) 10 MG tablet Take 10 mg by mouth Daily.     • BUPROPION HCL PO Take  by mouth Daily.     • cephalexin (KEFLEX) 500 MG capsule Take 1 capsule by mouth 3 (Three) Times a Day. 21 capsule 0   • CLONAZEPAM PO Take 1 mg by mouth At Night As Needed.     • Multiple Vitamins-Minerals (MULTIVITAMIN ADULT PO) Take  by mouth.     • venlafaxine XR (EFFEXOR-XR) 150 MG 24 hr capsule Take 150 mg by mouth Daily.     • [DISCONTINUED] sulfamethoxazole-trimethoprim (BACTRIM DS) 800-160 MG per tablet Take 1 tablet by  mouth 2 (Two) Times a Day. 28 tablet 0   • [DISCONTINUED] Tadalafil (CIALIS PO) Take  by mouth Daily.     • [DISCONTINUED] tamsulosin (FLOMAX) 0.4 MG capsule 24 hr capsule Take 1 capsule by mouth Every Night.       No Known Allergies    Objective    Objective     Vital Signs  Temp:  [99.8 °F (37.7 °C)] 99.8 °F (37.7 °C)  Heart Rate:  [80-92] 81  Resp:  [16] 16  BP: (130-139)/(70-77) 139/77  SpO2:  [94 %-98 %] 94 %  on   ;   Device (Oxygen Therapy): room air  Body mass index is 30.18 kg/m².    Physical Exam   Constitutional: He is oriented to person, place, and time. He appears well-developed and well-nourished. No distress.   HENT:   Head: Normocephalic and atraumatic.   Eyes: Conjunctivae and EOM are normal.   Neck: Normal range of motion. Neck supple.   Cardiovascular: Normal rate and regular rhythm.   Pulmonary/Chest: Effort normal and breath sounds normal. No respiratory distress.   Abdominal: Soft. Bowel sounds are normal. He exhibits no distension. There is no tenderness.   Musculoskeletal: Normal range of motion. He exhibits no edema.   Neurological: He is alert and oriented to person, place, and time.   Skin: Skin is warm and dry.   Psychiatric: He has a normal mood and affect. His behavior is normal.   Nursing note and vitals reviewed.      Results Review:  I reviewed the patient's new clinical results.  I reviewed the patient's new imaging results and agree with the interpretation.  I reviewed the patient's other test results and agree with the interpretation  I personally viewed and interpreted the patient's EKG/Telemetry data  Discussed with ED provider.    Lab Results (last 24 hours)     Procedure Component Value Units Date/Time    CBC & Differential [[id number]] Collected:  10/06/19 1338    Specimen:  Blood Updated:  10/06/19 6056    Narrative:       The following orders were created for panel order CBC & Differential.  Procedure                               Abnormality         Status                      ---------                               -----------         ------                     CBC Auto Differential[[id number]]        Abnormal            Final result                 Please view results for these tests on the individual orders.    Comprehensive Metabolic Panel [[id number]]  (Abnormal) Collected:  10/06/19 1338    Specimen:  Blood Updated:  10/06/19 1438     Glucose 139 mg/dL      BUN 15 mg/dL      Creatinine 1.09 mg/dL      Sodium 130 mmol/L      Potassium 4.3 mmol/L      Chloride 94 mmol/L      CO2 23.4 mmol/L      Calcium 8.7 mg/dL      Total Protein 7.5 g/dL      Albumin 4.20 g/dL      ALT (SGPT) 18 U/L      AST (SGOT) 21 U/L      Alkaline Phosphatase 78 U/L      Total Bilirubin 0.8 mg/dL      eGFR Non African Amer 66 mL/min/1.73      Globulin 3.3 gm/dL      A/G Ratio 1.3 g/dL      BUN/Creatinine Ratio 13.8     Anion Gap 12.6 mmol/L     Narrative:       GFR Normal >60  Chronic Kidney Disease <60  Kidney Failure <15    CBC Auto Differential [[id number]]  (Abnormal) Collected:  10/06/19 1338    Specimen:  Blood Updated:  10/06/19 1413     WBC 8.37 10*3/mm3      RBC 4.89 10*6/mm3      Hemoglobin 15.0 g/dL      Hematocrit 44.6 %      MCV 91.2 fL      MCH 30.7 pg      MCHC 33.6 g/dL      RDW 12.5 %      RDW-SD 40.9 fl      MPV 9.7 fL      Platelets 188 10*3/mm3      Neutrophil % 88.0 %      Lymphocyte % 4.5 %      Monocyte % 6.2 %      Eosinophil % 0.6 %      Basophil % 0.2 %      Immature Grans % 0.5 %      Neutrophils, Absolute 7.36 10*3/mm3      Lymphocytes, Absolute 0.38 10*3/mm3      Monocytes, Absolute 0.52 10*3/mm3      Eosinophils, Absolute 0.05 10*3/mm3      Basophils, Absolute 0.02 10*3/mm3      Immature Grans, Absolute 0.04 10*3/mm3      nRBC 0.0 /100 WBC     Protime-INR [[id number]]  (Abnormal) Collected:  10/06/19 1339    Specimen:  Blood Updated:  10/06/19 1418     Protime 14.5 Seconds      INR 1.16    Blood Culture - Blood, Arm, Right [[id number]]  (Abnormal) Collected:  10/06/19 1400     Specimen:  Blood from Arm, Right Updated:  10/07/19 0708     Blood Culture Abnormal Stain     Gram Stain Anaerobic Bottle Gram negative bacilli    Lactic Acid, Plasma [[id number]]  (Normal) Collected:  10/06/19 1400    Specimen:  Blood from Arm, Right Updated:  10/06/19 1429     Lactate 0.9 mmol/L     Urinalysis With Culture If Indicated - Urine, Catheter In/Out [[id number]]  (Abnormal) Collected:  10/06/19 1416    Specimen:  Urine, Catheter In/Out Updated:  10/06/19 1429     Color, UA Yellow     Appearance, UA Clear     pH, UA 8.5     Specific Gravity, UA 1.017     Glucose, UA Negative     Ketones, UA Negative     Bilirubin, UA Negative     Blood, UA Small (1+)     Protein,  mg/dL (2+)     Leuk Esterase, UA Small (1+)     Nitrite, UA Positive     Urobilinogen, UA 0.2 E.U./dL    Urinalysis, Microscopic Only - Urine, Catheter In/Out [[id number]]  (Abnormal) Collected:  10/06/19 1416    Specimen:  Urine, Catheter In/Out Updated:  10/06/19 1429     RBC, UA 6-12 /HPF      WBC, UA 21-30 /HPF      Bacteria, UA 4+ /HPF      Squamous Epithelial Cells, UA 0-2 /HPF      Hyaline Casts, UA 0-2 /LPF      Methodology Automated Microscopy    Urine Culture - Urine, Urine, Catheter In/Out [[id number]]  (Abnormal) Collected:  10/06/19 1416    Specimen:  Urine, Catheter In/Out Updated:  10/07/19 1043     Urine Culture >100,000 CFU/mL Gram Negative Bacilli    Blood Culture - Blood, Arm, Left [[id number]]  (Abnormal) Collected:  10/06/19 1427    Specimen:  Blood from Arm, Left Updated:  10/07/19 0708     Blood Culture Abnormal Stain     Gram Stain Anaerobic Bottle Gram negative bacilli    CBC & Differential [[id number]] Collected:  10/07/19 0941    Specimen:  Blood Updated:  10/07/19 1032    Narrative:       The following orders were created for panel order CBC & Differential.  Procedure                               Abnormality         Status                     ---------                               -----------         ------                      CBC Auto Differential[[id number]]        Abnormal            Final result                 Please view results for these tests on the individual orders.    Basic Metabolic Panel [[id number]]  (Abnormal) Collected:  10/07/19 0941    Specimen:  Blood Updated:  10/07/19 1014     Glucose 143 mg/dL      BUN 18 mg/dL      Creatinine 1.02 mg/dL      Sodium 130 mmol/L      Potassium 4.1 mmol/L      Chloride 95 mmol/L      CO2 23.9 mmol/L      Calcium 8.6 mg/dL      eGFR Non African Amer 72 mL/min/1.73      BUN/Creatinine Ratio 17.6     Anion Gap 11.1 mmol/L     Narrative:       GFR Normal >60  Chronic Kidney Disease <60  Kidney Failure <15    CBC Auto Differential [[id number]]  (Abnormal) Collected:  10/07/19 0941    Specimen:  Blood Updated:  10/07/19 1032     WBC 7.61 10*3/mm3      RBC 4.20 10*6/mm3      Hemoglobin 13.2 g/dL      Hematocrit 39.0 %      MCV 92.9 fL      MCH 31.4 pg      MCHC 33.8 g/dL      RDW 12.7 %      RDW-SD 43.0 fl      MPV 9.6 fL      Platelets 153 10*3/mm3      Neutrophil % 73.8 %      Lymphocyte % 7.1 %      Monocyte % 17.9 %      Eosinophil % 0.4 %      Basophil % 0.3 %      Immature Grans % 0.5 %      Neutrophils, Absolute 5.62 10*3/mm3      Lymphocytes, Absolute 0.54 10*3/mm3      Monocytes, Absolute 1.36 10*3/mm3      Eosinophils, Absolute 0.03 10*3/mm3      Basophils, Absolute 0.02 10*3/mm3      Immature Grans, Absolute 0.04 10*3/mm3      nRBC 0.0 /100 WBC     Lactic Acid, Plasma [[id number]]  (Normal) Collected:  10/07/19 0941    Specimen:  Blood Updated:  10/07/19 1006     Lactate 0.6 mmol/L           Imaging Results (last 24 hours)     ** No results found for the last 24 hours. **               No orders to display        Assessment/Plan     Active Hospital Problems    Diagnosis POA   • **Gram-negative bacteremia [R78.81] Yes   • BPH with obstruction/lower urinary tract symptoms [N40.1, N13.8] Yes   • Neurogenic bladder [N31.9] Yes   • H/O left nephrectomy [Z90.5] Not Applicable   •  HTN (hypertension) [I10] Yes   • Hyponatremia [E87.1] Unknown   • UTI (urinary tract infection) [N39.0] Yes     Bacteremia secondary to UTI  -IV ceftriaxone 2 g  -trend culture for sensitivity     Mild hyponatremia  -IVFs    HTN  - continue home meds    I discussed the patient's findings and my recommendations with patient, family and nursing staff.    VTE Prophylaxis - SCDs.  Code Status - Full code.       ABDOULAYE Chun  [facility]  10/07/19  11:09 AM    I supervised care provided by the ABDOULAYE. We have discussed the case. I have reviewed  the note and agree with the plan of treatment. I personally interviewed the patient and examined the patient.  My exam shows a pleasant 74-year-old.  Abdomen soft.  Lungs are clear.  No respiratory distress.  Continue IV antibiotics and follow up cultures.  We will see if he will be able to be discharged on something like p.o. Levaquin eventually or if he will need IV antibiotics at discharge depending on susceptibilities.  In and out cath and will have his urologist consulted.    Leopoldo Mena MD  [facility]  10/07/19  4:43 PM

## 2019-10-07 NOTE — PLAN OF CARE
Problem: Patient Care Overview  Goal: Plan of Care Review  Outcome: Ongoing (interventions implemented as appropriate)   10/07/19 4975   Coping/Psychosocial   Plan of Care Reviewed With patient   Plan of Care Review   Progress no change   OTHER   Outcome Summary Pt admitted from ED. A&Ox4. Up adlib. Positive blood cultures and UTI. Patient self caths. IVF started. IV abx given in ER, will continue tonight. VSS. Pt resting comfortably. No c/o pain, n/v/d. Will continue to monitor.      Goal: Individualization and Mutuality  Outcome: Ongoing (interventions implemented as appropriate)    Goal: Discharge Needs Assessment  Outcome: Ongoing (interventions implemented as appropriate)    Goal: Interprofessional Rounds/Family Conf  Outcome: Ongoing (interventions implemented as appropriate)      Problem: Infection, Risk/Actual (Adult)  Goal: Identify Related Risk Factors and Signs and Symptoms  Outcome: Ongoing (interventions implemented as appropriate)    Goal: Infection Prevention/Resolution  Outcome: Ongoing (interventions implemented as appropriate)

## 2019-10-07 NOTE — ED TRIAGE NOTES
Pt reports he was seen here yesterday for a fever. Pt reports he was told to come back because of abnormal labs.

## 2019-10-07 NOTE — ED PROVIDER NOTES
EMERGENCY DEPARTMENT ENCOUNTER    Room Number:  12/12  Date of encounter:  10/7/2019  PCP: Kimberlee Nicholas APRN  Historian: Patient, wife      HPI:  Chief Complaint: Abnormal blood cultures  A complete HPI/ROS/PMH/PSH/SH/FH are unobtainable due to: None    Context: Lazarus Roe is a 74 y.o. male who presents to the ED c/o told to come to the ED for abnormal blood cultures.  Patient is a gentleman who self caths and has had recent fever and chills.  Was seen in the ED yesterday for fever and chills.  He was diagnosed to have a urinary tract infection and was offered admission at that time.  Patient refused admission says he really felt much better and wanted to go home.  He was given IV Rocephin and oral Keflex.  He had follow-up today with urology but was told to come back to the ED earlier because of blood cultures are growing gram-negative bacilli.      PAST MEDICAL HISTORY  Active Ambulatory Problems     Diagnosis Date Noted   • UTI (urinary tract infection) 10/12/2018     Resolved Ambulatory Problems     Diagnosis Date Noted   • No Resolved Ambulatory Problems     Past Medical History:   Diagnosis Date   • Depression    • History of nephrectomy    • Hypertension    • Mitral valve prolapse    • Renal mass    • Self-catheterizes urinary bladder    • Sepsis (CMS/HCC)    • UTI (urinary tract infection)          PAST SURGICAL HISTORY  Past Surgical History:   Procedure Laterality Date   • HERNIA REPAIR     • NEPHRECTOMY Left    • ROTATOR CUFF REPAIR Left    • TONSILLECTOMY     • TURP / TRANSURETHRAL INCISION / DRAINAGE PROSTATE           FAMILY HISTORY  History reviewed. No pertinent family history.      SOCIAL HISTORY  Social History     Socioeconomic History   • Marital status:      Spouse name: Not on file   • Number of children: Not on file   • Years of education: Not on file   • Highest education level: Not on file   Tobacco Use   • Smoking status: Former Smoker     Last attempt to quit:  10/10/1984     Years since quittin.0   • Smokeless tobacco: Never Used   Substance and Sexual Activity   • Alcohol use: Yes     Alcohol/week: 16.8 oz     Types: 28 Standard drinks or equivalent per week   • Drug use: No   • Sexual activity: Defer         ALLERGIES  Patient has no known allergies.        REVIEW OF SYSTEMS  Review of Systems   Constitutional: Positive for chills and fever.   HENT: Negative.  Negative for sore throat.    Eyes: Negative.    Respiratory: Negative.  Negative for cough and shortness of breath.    Cardiovascular: Negative.  Negative for chest pain.   Gastrointestinal: Negative.    Genitourinary: Positive for dysuria.        Patient self caths.  Had some troubles on Friday but recently no issues.   Musculoskeletal: Positive for myalgias. Negative for back pain.   Skin: Negative.  Negative for rash.   Neurological: Negative.  Negative for headaches.   All other systems reviewed and are negative.         PHYSICAL EXAM    I have reviewed the triage vital signs and nursing notes.    ED Triage Vitals [10/07/19 0901]   Temp Heart Rate Resp BP SpO2   99.8 °F (37.7 °C) 92 16 -- 98 %      Temp src Heart Rate Source Patient Position BP Location FiO2 (%)   Tympanic Monitor -- -- --       Physical Exam  GENERAL: not distressed, nontoxic appearance  HENT: nares patent  EYES: no scleral icterus  CV: regular rhythm, regular rate  RESPIRATORY: normal effort  ABDOMEN: soft  MUSCULOSKELETAL: no deformity  NEURO: alert, moves all extremities, follows commands  SKIN: warm, dry        LAB RESULTS  Recent Results (from the past 24 hour(s))   Green Top (Gel)    Collection Time: 10/06/19  1:38 PM   Result Value Ref Range    Extra Tube Hold for add-ons.    Lavender Top    Collection Time: 10/06/19  1:38 PM   Result Value Ref Range    Extra Tube hold for add-on    Gold Top - SST    Collection Time: 10/06/19  1:38 PM   Result Value Ref Range    Extra Tube Hold for add-ons.    Comprehensive Metabolic Panel     Collection Time: 10/06/19  1:38 PM   Result Value Ref Range    Glucose 139 (H) 65 - 99 mg/dL    BUN 15 8 - 23 mg/dL    Creatinine 1.09 0.76 - 1.27 mg/dL    Sodium 130 (L) 136 - 145 mmol/L    Potassium 4.3 3.5 - 5.2 mmol/L    Chloride 94 (L) 98 - 107 mmol/L    CO2 23.4 22.0 - 29.0 mmol/L    Calcium 8.7 8.6 - 10.5 mg/dL    Total Protein 7.5 6.0 - 8.5 g/dL    Albumin 4.20 3.50 - 5.20 g/dL    ALT (SGPT) 18 1 - 41 U/L    AST (SGOT) 21 1 - 40 U/L    Alkaline Phosphatase 78 39 - 117 U/L    Total Bilirubin 0.8 0.2 - 1.2 mg/dL    eGFR Non African Amer 66 >60 mL/min/1.73    Globulin 3.3 gm/dL    A/G Ratio 1.3 g/dL    BUN/Creatinine Ratio 13.8 7.0 - 25.0    Anion Gap 12.6 5.0 - 15.0 mmol/L   CBC Auto Differential    Collection Time: 10/06/19  1:38 PM   Result Value Ref Range    WBC 8.37 3.40 - 10.80 10*3/mm3    RBC 4.89 4.14 - 5.80 10*6/mm3    Hemoglobin 15.0 13.0 - 17.7 g/dL    Hematocrit 44.6 37.5 - 51.0 %    MCV 91.2 79.0 - 97.0 fL    MCH 30.7 26.6 - 33.0 pg    MCHC 33.6 31.5 - 35.7 g/dL    RDW 12.5 12.3 - 15.4 %    RDW-SD 40.9 37.0 - 54.0 fl    MPV 9.7 6.0 - 12.0 fL    Platelets 188 140 - 450 10*3/mm3    Neutrophil % 88.0 (H) 42.7 - 76.0 %    Lymphocyte % 4.5 (L) 19.6 - 45.3 %    Monocyte % 6.2 5.0 - 12.0 %    Eosinophil % 0.6 0.3 - 6.2 %    Basophil % 0.2 0.0 - 1.5 %    Immature Grans % 0.5 0.0 - 0.5 %    Neutrophils, Absolute 7.36 (H) 1.70 - 7.00 10*3/mm3    Lymphocytes, Absolute 0.38 (L) 0.70 - 3.10 10*3/mm3    Monocytes, Absolute 0.52 0.10 - 0.90 10*3/mm3    Eosinophils, Absolute 0.05 0.00 - 0.40 10*3/mm3    Basophils, Absolute 0.02 0.00 - 0.20 10*3/mm3    Immature Grans, Absolute 0.04 0.00 - 0.05 10*3/mm3    nRBC 0.0 0.0 - 0.2 /100 WBC   Light Blue Top    Collection Time: 10/06/19  1:39 PM   Result Value Ref Range    Extra Tube hold for add-on    Protime-INR    Collection Time: 10/06/19  1:39 PM   Result Value Ref Range    Protime 14.5 (H) 11.7 - 14.2 Seconds    INR 1.16 (H) 0.90 - 1.10   Blood Culture - Blood, Arm,  Right    Collection Time: 10/06/19  2:00 PM   Result Value Ref Range    Blood Culture Abnormal Stain (A)     Gram Stain Anaerobic Bottle Gram negative bacilli    Lactic Acid, Plasma    Collection Time: 10/06/19  2:00 PM   Result Value Ref Range    Lactate 0.9 0.5 - 2.0 mmol/L   Urinalysis With Culture If Indicated - Urine, Catheter In/Out    Collection Time: 10/06/19  2:16 PM   Result Value Ref Range    Color, UA Yellow Yellow, Straw    Appearance, UA Clear Clear    pH, UA 8.5 (H) 5.0 - 8.0    Specific Gravity, UA 1.017 1.005 - 1.030    Glucose, UA Negative Negative    Ketones, UA Negative Negative    Bilirubin, UA Negative Negative    Blood, UA Small (1+) (A) Negative    Protein,  mg/dL (2+) (A) Negative    Leuk Esterase, UA Small (1+) (A) Negative    Nitrite, UA Positive (A) Negative    Urobilinogen, UA 0.2 E.U./dL 0.2 - 1.0 E.U./dL   Urinalysis, Microscopic Only - Urine, Catheter In/Out    Collection Time: 10/06/19  2:16 PM   Result Value Ref Range    RBC, UA 6-12 (A) None Seen, 0-2 /HPF    WBC, UA 21-30 (A) None Seen, 0-2 /HPF    Bacteria, UA 4+ (A) None Seen /HPF    Squamous Epithelial Cells, UA 0-2 None Seen, 0-2 /HPF    Hyaline Casts, UA 0-2 None Seen /LPF    Methodology Automated Microscopy    Blood Culture - Blood, Arm, Left    Collection Time: 10/06/19  2:27 PM   Result Value Ref Range    Blood Culture Abnormal Stain (A)     Gram Stain Anaerobic Bottle Gram negative bacilli    Basic Metabolic Panel    Collection Time: 10/07/19  9:41 AM   Result Value Ref Range    Glucose 143 (H) 65 - 99 mg/dL    BUN 18 8 - 23 mg/dL    Creatinine 1.02 0.76 - 1.27 mg/dL    Sodium 130 (L) 136 - 145 mmol/L    Potassium 4.1 3.5 - 5.2 mmol/L    Chloride 95 (L) 98 - 107 mmol/L    CO2 23.9 22.0 - 29.0 mmol/L    Calcium 8.6 8.6 - 10.5 mg/dL    eGFR Non African Amer 72 >60 mL/min/1.73    BUN/Creatinine Ratio 17.6 7.0 - 25.0    Anion Gap 11.1 5.0 - 15.0 mmol/L   Lactic Acid, Plasma    Collection Time: 10/07/19  9:41 AM   Result  Value Ref Range    Lactate 0.6 0.5 - 2.0 mmol/L       Ordered the above labs and independently reviewed the results.        RADIOLOGY  No Radiology Exams Resulted Within Past 24 Hours    I ordered the above noted radiological studies. Reviewed by me and discussed with radiologist.  See dictation for official radiology interpretation.      PROCEDURES    Procedures      MEDICATIONS GIVEN IN ER    Medications   cefTRIAXone (ROCEPHIN) IVPB 2 g (2 g Intravenous New Bag 10/7/19 0949)         PROGRESS, DATA ANALYSIS, CONSULTS, AND MEDICAL DECISION MAKING    All labs have been independently reviewed by me.  All radiology studies have been reviewed by me and discussed with radiologist dictating the report.   EKG's independently viewed and interpreted by me.  Discussion below represents my analysis of pertinent findings related to patient's condition, differential diagnosis, treatment plan and final disposition.      ED Course as of Oct 07 1023   Mon Oct 07, 2019   0939 I spoke with Dr. Sean Mena who will admit this patient.  He request that I give 2 g of Rocephin instead of 1 g.  Will admit to a MedSur bed pending results of final blood cultures and further evaluation of patient.  [DB]      ED Course User Index  [DB] Luis Snyder MD       AS OF 10:23 AM VITALS:    BP - 139/77  HR - 81  TEMP - 99.8 °F (37.7 °C) (Tympanic)  02 SATS - 94%        DIAGNOSIS  Final diagnoses:   Gram-negative bacteremia   Complicated urinary tract infection         DISPOSITION  Admission         Luis Snyder MD  10/07/19 1028

## 2019-10-08 ENCOUNTER — APPOINTMENT (OUTPATIENT)
Dept: CT IMAGING | Facility: HOSPITAL | Age: 74
End: 2019-10-08

## 2019-10-08 PROBLEM — A49.9 ESBL (EXTENDED SPECTRUM BETA-LACTAMASE) PRODUCING BACTERIA INFECTION: Status: ACTIVE | Noted: 2019-10-08

## 2019-10-08 PROBLEM — Z16.12 ESBL (EXTENDED SPECTRUM BETA-LACTAMASE) PRODUCING BACTERIA INFECTION: Status: ACTIVE | Noted: 2019-10-08

## 2019-10-08 LAB
ANION GAP SERPL CALCULATED.3IONS-SCNC: 12.2 MMOL/L (ref 5–15)
BACTERIA SPEC AEROBE CULT: ABNORMAL
BASOPHILS # BLD MANUAL: 0.12 10*3/MM3 (ref 0–0.2)
BASOPHILS NFR BLD AUTO: 2 % (ref 0–1.5)
BUN BLD-MCNC: 15 MG/DL (ref 8–23)
BUN/CREAT SERPL: 16 (ref 7–25)
CALCIUM SPEC-SCNC: 8.3 MG/DL (ref 8.6–10.5)
CHLORIDE SERPL-SCNC: 92 MMOL/L (ref 98–107)
CO2 SERPL-SCNC: 23.8 MMOL/L (ref 22–29)
CREAT BLD-MCNC: 0.94 MG/DL (ref 0.76–1.27)
DEPRECATED RDW RBC AUTO: 41 FL (ref 37–54)
ERYTHROCYTE [DISTWIDTH] IN BLOOD BY AUTOMATED COUNT: 12.1 % (ref 12.3–15.4)
GFR SERPL CREATININE-BSD FRML MDRD: 79 ML/MIN/1.73
GLUCOSE BLD-MCNC: 133 MG/DL (ref 65–99)
GRAM STN SPEC: ABNORMAL
GRAM STN SPEC: ABNORMAL
HCT VFR BLD AUTO: 36.7 % (ref 37.5–51)
HGB BLD-MCNC: 12.7 G/DL (ref 13–17.7)
LYMPHOCYTES # BLD MANUAL: 1.11 10*3/MM3 (ref 0.7–3.1)
LYMPHOCYTES NFR BLD MANUAL: 17 % (ref 5–12)
LYMPHOCYTES NFR BLD MANUAL: 18 % (ref 19.6–45.3)
MCH RBC QN AUTO: 31.7 PG (ref 26.6–33)
MCHC RBC AUTO-ENTMCNC: 34.6 G/DL (ref 31.5–35.7)
MCV RBC AUTO: 91.5 FL (ref 79–97)
MONOCYTES # BLD AUTO: 1.04 10*3/MM3 (ref 0.1–0.9)
NEUTROPHILS # BLD AUTO: 3.87 10*3/MM3 (ref 1.7–7)
NEUTROPHILS NFR BLD MANUAL: 63 % (ref 42.7–76)
PLAT MORPH BLD: NORMAL
PLATELET # BLD AUTO: 148 10*3/MM3 (ref 140–450)
PMV BLD AUTO: 9.3 FL (ref 6–12)
POTASSIUM BLD-SCNC: 3.8 MMOL/L (ref 3.5–5.2)
PSA SERPL-MCNC: 0.3 NG/ML (ref 0–4)
RBC # BLD AUTO: 4.01 10*6/MM3 (ref 4.14–5.8)
RBC MORPH BLD: NORMAL
SODIUM BLD-SCNC: 128 MMOL/L (ref 136–145)
WBC MORPH BLD: NORMAL
WBC NRBC COR # BLD: 6.14 10*3/MM3 (ref 3.4–10.8)

## 2019-10-08 PROCEDURE — 25010000002 IOPAMIDOL 61 % SOLUTION: Performed by: INTERNAL MEDICINE

## 2019-10-08 PROCEDURE — 80048 BASIC METABOLIC PNL TOTAL CA: CPT | Performed by: NURSE PRACTITIONER

## 2019-10-08 PROCEDURE — 84153 ASSAY OF PSA TOTAL: CPT | Performed by: INTERNAL MEDICINE

## 2019-10-08 PROCEDURE — 90686 IIV4 VACC NO PRSV 0.5 ML IM: CPT | Performed by: INTERNAL MEDICINE

## 2019-10-08 PROCEDURE — 36415 COLL VENOUS BLD VENIPUNCTURE: CPT | Performed by: NURSE PRACTITIONER

## 2019-10-08 PROCEDURE — 87040 BLOOD CULTURE FOR BACTERIA: CPT | Performed by: INTERNAL MEDICINE

## 2019-10-08 PROCEDURE — 25010000002 INFLUENZA VAC SPLIT QUAD 0.5 ML SUSPENSION PREFILLED SYRINGE: Performed by: INTERNAL MEDICINE

## 2019-10-08 PROCEDURE — 74178 CT ABD&PLV WO CNTR FLWD CNTR: CPT

## 2019-10-08 PROCEDURE — 85007 BL SMEAR W/DIFF WBC COUNT: CPT | Performed by: NURSE PRACTITIONER

## 2019-10-08 PROCEDURE — G0008 ADMIN INFLUENZA VIRUS VAC: HCPCS | Performed by: INTERNAL MEDICINE

## 2019-10-08 PROCEDURE — 25010000002 ERTAPENEM PER 500 MG: Performed by: NURSE PRACTITIONER

## 2019-10-08 PROCEDURE — 85025 COMPLETE CBC W/AUTO DIFF WBC: CPT | Performed by: NURSE PRACTITIONER

## 2019-10-08 RX ORDER — SODIUM CHLORIDE 1000 MG
2 TABLET, SOLUBLE MISCELLANEOUS ONCE
Status: COMPLETED | OUTPATIENT
Start: 2019-10-08 | End: 2019-10-08

## 2019-10-08 RX ORDER — SODIUM CHLORIDE 9 MG/ML
100 INJECTION, SOLUTION INTRAVENOUS CONTINUOUS
Status: ACTIVE | OUTPATIENT
Start: 2019-10-08 | End: 2019-10-08

## 2019-10-08 RX ORDER — SENNA AND DOCUSATE SODIUM 50; 8.6 MG/1; MG/1
1 TABLET, FILM COATED ORAL 2 TIMES DAILY
Status: DISCONTINUED | OUTPATIENT
Start: 2019-10-08 | End: 2019-10-09 | Stop reason: HOSPADM

## 2019-10-08 RX ADMIN — CLONAZEPAM 1 MG: 1 TABLET ORAL at 21:00

## 2019-10-08 RX ADMIN — SODIUM CHLORIDE 100 ML/HR: 9 INJECTION, SOLUTION INTRAVENOUS at 15:45

## 2019-10-08 RX ADMIN — SENNOSIDES AND DOCUSATE SODIUM 1 TABLET: 8.6; 5 TABLET ORAL at 21:00

## 2019-10-08 RX ADMIN — SODIUM CHLORIDE, PRESERVATIVE FREE 10 ML: 5 INJECTION INTRAVENOUS at 12:46

## 2019-10-08 RX ADMIN — SENNOSIDES AND DOCUSATE SODIUM 1 TABLET: 8.6; 5 TABLET ORAL at 15:45

## 2019-10-08 RX ADMIN — ERTAPENEM SODIUM 1 G: 1 INJECTION, POWDER, LYOPHILIZED, FOR SOLUTION INTRAMUSCULAR; INTRAVENOUS at 04:05

## 2019-10-08 RX ADMIN — SODIUM CHLORIDE TAB 1 GM 2 G: 1 TAB at 15:46

## 2019-10-08 RX ADMIN — VENLAFAXINE HYDROCHLORIDE 150 MG: 150 CAPSULE, EXTENDED RELEASE ORAL at 09:11

## 2019-10-08 RX ADMIN — SODIUM CHLORIDE, PRESERVATIVE FREE 10 ML: 5 INJECTION INTRAVENOUS at 21:00

## 2019-10-08 RX ADMIN — INFLUENZA A VIRUS A/BRISBANE/02/2018 IVR-190 (H1N1) ANTIGEN (PROPIOLACTONE INACTIVATED), INFLUENZA A VIRUS A/KANSAS/14/2017 X-327 (H3N2) ANTIGEN (PROPIOLACTONE INACTIVATED), INFLUENZA B VIRUS B/MARYLAND/15/2016 ANTIGEN (PROPIOLACTONE INACTIVATED), INFLUENZA B VIRUS B/PHUKET/3073/2013 BVR-1B ANTIGEN (PROPIOLACTONE INACTIVATED) 0.5 ML: 15; 15; 15; 15 INJECTION, SUSPENSION INTRAMUSCULAR at 12:45

## 2019-10-08 RX ADMIN — AMLODIPINE BESYLATE 10 MG: 10 TABLET ORAL at 09:11

## 2019-10-08 RX ADMIN — IOPAMIDOL 85 ML: 612 INJECTION, SOLUTION INTRAVENOUS at 09:30

## 2019-10-08 NOTE — PROGRESS NOTES
Discharge Planning Assessment  [facility]     Patient Name: Lazarus Roe  MRN: [id number]  Today's Date: 10/8/2019    Admit Date: 10/7/2019    Discharge Needs Assessment     Row Name 10/08/19 1240       Living Environment    Lives With  spouse    Name(s) of Who Lives With Patient  wife, Lillie Roe, [phone]    Current Living Arrangements  home/apartment/condo    Primary Care Provided by  self    Provides Primary Care For  no one    Family Caregiver if Needed  spouse    Quality of Family Relationships  involved;supportive;helpful    Able to Return to Prior Arrangements  yes       Resource/Environmental Concerns    Resource/Environmental Concerns  none       Transition Planning    Patient/Family Anticipates Transition to  home with family;home with help/services    Patient/Family Anticipated Services at Transition  home health care    Transportation Anticipated  family or friend will provide       Discharge Needs Assessment    Equipment Currently Used at Home  none    Discharge Coordination/Progress  Home        Discharge Plan     Row Name 10/08/19 1241       Plan    Plan  Home with wife,  Infusion if IV antibiotics needed at d/c    Patient/Family in Agreement with Plan  yes    Plan Comments  IMM letter checked. CCP met with pt to discuss d/c planning and verify facesheet. Pt resides with his wife in a two level home with basement steps, uses no DME, and has no h/o home health or sub-acute rehab. Pt's pharmacy is [facility]. Pt uncertain of d/c needs pending treatment course, but is agreeable to use [facility] if antibiotics are needed at d/c. CCP to follow. Shadia Koenig [facility]        Destination      No service coordination in this encounter.      Durable Medical Equipment      No service coordination in this encounter.      Dialysis/Infusion      No service coordination in this encounter.      Home Medical Care      No service coordination in this encounter.      Therapy       No service coordination in this encounter.      Community Resources      No service coordination in this encounter.          Demographic Summary     Row Name 10/08/19 1239       General Information    Admission Type  inpatient    Arrived From  home    Required Notices Provided  Important Message from Medicare    Referral Source  admission list    Reason for Consult  discharge planning    Preferred Language  English        Functional Status     Row Name 10/08/19 1239       Functional Status    Usual Activity Tolerance  good    Current Activity Tolerance  good       Functional Status, IADL    Medications  independent    Meal Preparation  independent    Housekeeping  independent    Laundry  independent    Shopping  independent       Mental Status Summary    Recent Changes in Mental Status/Cognitive Functioning  no changes        Psychosocial    No documentation.       Abuse/Neglect    No documentation.       Legal    No documentation.       Substance Abuse    No documentation.       Patient Forms    No documentation.           Deepika Koenig LCSW

## 2019-10-08 NOTE — PROGRESS NOTES
Name: Lazarus Roe ADMIT: 10/7/2019   : 1945  PCP: Kimberlee Nicholas APRN    MRN: [id number] LOS: 1 days   AGE/SEX: 74 y.o. male  ROOM: [facility]   Subjective   Chief Complaint   Patient presents with   • Fever   fever better  On IV abx  Changed as +ESBL  Drinking a lot of fluids  CT today    ROS  No f/c  No n/v  No cp/palp  No soa/cough    Objective   Vital Signs  Temp:  [97 °F (36.1 °C)-97.9 °F (36.6 °C)] 97.8 °F (36.6 °C)  Heart Rate:  [] 72  Resp:  [18-20] 18  BP: (118-133)/(69-79) 124/72  SpO2:  [96 %-99 %] 98 %  on   ;   Device (Oxygen Therapy): room air  Body mass index is 30.18 kg/m².    Physical Exam   Constitutional: He is oriented to person, place, and time. He appears well-developed and well-nourished. No distress.   HENT:   Head: Normocephalic and atraumatic.   Mouth/Throat: Oropharynx is clear and moist.   Eyes: Conjunctivae are normal. No scleral icterus.   Neck: Normal range of motion. Neck supple.   Cardiovascular: Normal rate, regular rhythm and normal heart sounds.   No murmur heard.  Pulmonary/Chest: Effort normal and breath sounds normal. No respiratory distress.   Abdominal: Soft. Bowel sounds are normal. There is no tenderness.   Musculoskeletal: He exhibits no edema or deformity.   Neurological: He is alert and oriented to person, place, and time.   Skin: Skin is warm and dry. He is not diaphoretic.   Psychiatric: He has a normal mood and affect. His behavior is normal. Thought content normal.   Nursing note and vitals reviewed.      Results Review:       I reviewed the patient's new clinical results.  Results from last 7 days   Lab Units 10/08/19  0423 10/07/19  0941 10/06/19  1338   WBC 10*3/mm3 6.14 7.61 8.37   HEMOGLOBIN g/dL 12.7* 13.2 15.0   PLATELETS 10*3/mm3 148 153 188     Results from last 7 days   Lab Units 10/08/19  0423 10/07/19  0941 10/06/19  1338   SODIUM mmol/L 128* 130* 130*   POTASSIUM mmol/L 3.8 4.1 4.3   CHLORIDE mmol/L 92* 95* 94*   CO2 mmol/L 23.8  23.9 23.4   BUN mg/dL 15 18 15   CREATININE mg/dL 0.94 1.02 1.09   GLUCOSE mg/dL 133* 143* 139*   Estimated Creatinine Clearance: 81.2 mL/min (by C-G formula based on SCr of 0.94 mg/dL).  Results from last 7 days   Lab Units 10/06/19  1338   ALBUMIN g/dL 4.20   BILIRUBIN mg/dL 0.8   ALK PHOS U/L 78   AST (SGOT) U/L 21   ALT (SGPT) U/L 18     Results from last 7 days   Lab Units 10/08/19  0423 10/07/19  0941 10/06/19  1338   CALCIUM mg/dL 8.3* 8.6 8.7   ALBUMIN g/dL  --   --  4.20     Results from last 7 days   Lab Units 10/07/19  0941 10/06/19  1400   LACTATE mmol/L 0.6 0.9     10/06/2019 1427  10/08/2019 0634  Blood Culture - Blood, Arm, Left [[id number]]   (Abnormal)   Blood from Arm, Left     Preliminary result  Blood Culture Escherichia coli Abnormal    Gram Stain Anaerobic Bottle Gram negative bacilli             10/06/2019 1427  10/07/2019 1255  Blood Culture ID, PCR - Blood, Arm, Left [[id number]]   (Abnormal)   Blood from Arm, Left     Final result  BCID, PCR Escherichia coli. Identification by BCID PCR. Critical              10/06/2019 1416  10/08/2019 0822  Urine Culture - Urine, Urine, Catheter In/Out [[id number]]    (Abnormal)   Urine, Catheter In/Out     Edited Result - FINAL  Urine Culture >100,000 CFU/mL Escherichia coli ESBL Critical       Consider infectious disease consult.  Susceptibility results may not correlate to clinical outcomes.       Susceptibility      Escherichia coli ESBL     LAISHA     Ertapenem <=0.5  Susceptible (C)1     Gentamicin <=1  Susceptible     Levofloxacin >=8  Resistant     Meropenem <=0.25  Susceptible     Nitrofurantoin <=16  Susceptible     Piperacillin + Tazobactam <=4  Susceptible     Tetracycline <=1  Susceptible     Trimethoprim + Sulfamethoxazole <=20  Susceptible           1 Appended report. These results have been appended to a previously final verified report.                   Coag   Results from last 7 days   Lab Units 10/06/19  1339   INR  1.16*     HbA1C No results  found for: HGBA1C  Infection   Results from last 7 days   Lab Units 10/06/19  1427 10/06/19  1416 10/06/19  1400   BLOODCX  Escherichia coli*  --  Escherichia coli*   URINECX   --  >100,000 CFU/mL Escherichia coli ESBL*  --    BCIDPCR  Escherichia coli. Identification by BCID PCR.*  --   --      Radiology(recent) Ct Abdomen Pelvis With & Without Contrast    Result Date: 10/8/2019  1. Irregular and diffuse circumferential bladder wall thickening. Cystitis is considered likely. No renal calculi or hydronephrosis. 2. Haziness within the root of the small bowel mesentery with subcentimeter lymphadenopathy. 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 3. Colonic diverticulosis.  Radiation dose reduction techniques were utilized, including automated exposure control and exposure modulation based on body size.       No results found for: TROPONINT, TROPONINI, BNP  No components found for: TSH;2      amLODIPine 10 mg Oral Daily   ertapenem 1 g Intravenous Q24H   sennosides-docusate sodium 1 tablet Oral BID   sodium chloride 10 mL Intravenous Q12H   sodium chloride 2 g Oral Once   venlafaxine  mg Oral Daily       sodium chloride 100 mL/hr   Diet Regular      Assessment/Plan      Active Hospital Problems    Diagnosis  POA   • **Gram-negative bacteremia [R78.81]  Yes   • ESBL (extended spectrum beta-lactamase) producing bacteria infection [A49.9, Z16.12]  Unknown   • BPH with obstruction/lower urinary tract symptoms [N40.1, N13.8]  Yes   • Neurogenic bladder [N31.9]  Yes   • H/O left nephrectomy [Z90.5]  Not Applicable   • HTN (hypertension) [I10]  Yes   • Hyponatremia [E87.1]  Unknown   • UTI (urinary tract infection) [N39.0]  Yes      Resolved Hospital Problems   No resolved problems to display.       · ABX changed to ertapenem. Repeat BC. ID consultation  · CT today. Read above,  recommend repeat CT in 1 year  · To see GI as outpatient to be update on colon ca screening  · Sodium a little worse. Will give a little more NS- I suspect he is drinking too much free water as he feels it will improve UTI. Will give a couple of salt tabs to help get it a little higher before dc  · I/O cath  · Above meds      JARED RN  Reviewed records    Leopoldo Mena MD  [facility]  10/08/19  1:02 PM

## 2019-10-08 NOTE — PLAN OF CARE
Problem: Patient Care Overview  Goal: Plan of Care Review  Outcome: Ongoing (interventions implemented as appropriate)   10/07/19 1619 10/07/19 2004 10/08/19 0443   Coping/Psychosocial   Plan of Care Reviewed With --  patient --    Plan of Care Review   Progress no change --  --    OTHER   Outcome Summary --  --  Urine culture came back with e.coli esbl of urine, contact precautions placed. IV Invanz begun. IVF's finished. Ad renay. Self caths. VSS. Will continue to monitor.     Goal: Individualization and Mutuality  Outcome: Ongoing (interventions implemented as appropriate)    Goal: Discharge Needs Assessment  Outcome: Ongoing (interventions implemented as appropriate)    Goal: Interprofessional Rounds/Family Conf  Outcome: Ongoing (interventions implemented as appropriate)      Problem: Infection, Risk/Actual (Adult)  Goal: Identify Related Risk Factors and Signs and Symptoms  Outcome: Outcome(s) achieved Date Met: 10/08/19    Goal: Infection Prevention/Resolution  Outcome: Ongoing (interventions implemented as appropriate)

## 2019-10-08 NOTE — PLAN OF CARE
Problem: Patient Care Overview  Goal: Plan of Care Review  Outcome: Ongoing (interventions implemented as appropriate)    Goal: Individualization and Mutuality  Outcome: Ongoing (interventions implemented as appropriate)    Goal: Discharge Needs Assessment  Outcome: Ongoing (interventions implemented as appropriate)    Goal: Interprofessional Rounds/Family Conf  Outcome: Ongoing (interventions implemented as appropriate)      Problem: Infection, Risk/Actual (Adult)  Goal: Infection Prevention/Resolution  Outcome: Ongoing (interventions implemented as appropriate)

## 2019-10-08 NOTE — CONSULTS
FIRST UROLOGY CONSULT      Patient Identification:  NAME:  Lazarus Roe  Age:  74 y.o.   Sex:  male   :  1945   MRN:  [id number]       Chief complaint: Fever    History of present illness:  This is a 74 year old man with a history of BPH and urinary retention now with an atonic bladder who performs self catheterization 5x daily. He developed high fever at home on . He presented to the ER, where he was given a dose of Rocephin and discharged on Keflex. Ultimately, blood cultures from the ER grew gram negative bacilli, and he was instructed to return to the ER. He was admitted to the hospital with urosepsis.    He reports slight left testicular swelling and tenderness as well.    Past medical history:  Past Medical History:   Diagnosis Date   • BPH with obstruction/lower urinary tract symptoms 10/7/2019   • Depression    • H/O left nephrectomy 10/7/2019   • History of nephrectomy     Left   • HTN (hypertension) 10/7/2019   • Hypertension    • Hyponatremia 10/7/2019   • Mitral valve prolapse    • Neurogenic bladder 10/7/2019   • Renal mass    • Self-catheterizes urinary bladder    • Sepsis (CMS/HCC)    • UTI (urinary tract infection)        Past surgical history:  Past Surgical History:   Procedure Laterality Date   • HERNIA REPAIR     • NEPHRECTOMY Left    • ROTATOR CUFF REPAIR Left    • TONSILLECTOMY     • TURP / TRANSURETHRAL INCISION / DRAINAGE PROSTATE         Allergies:  Patient has no known allergies.    Home medications:  Medications Prior to Admission   Medication Sig Dispense Refill Last Dose   • amLODIPine (NORVASC) 10 MG tablet Take 10 mg by mouth Daily.   10/7/2019 at Unknown time   • cephalexin (KEFLEX) 500 MG capsule Take 1 capsule by mouth 3 (Three) Times a Day. 21 capsule 0 10/7/2019 at Unknown time   • CLONAZEPAM PO Take 1 mg by mouth At Night As Needed.   10/6/2019 at Unknown time   • Multiple Vitamins-Minerals (MULTIVITAMIN ADULT PO) Take  by mouth.   10/7/2019 at Unknown  time   • venlafaxine XR (EFFEXOR-XR) 150 MG 24 hr capsule Take 150 mg by mouth Daily.   10/7/2019 at Unknown time        Hospital medications:    amLODIPine 10 mg Oral Daily   ertapenem 1 g Intravenous Q24H   influenza vaccine 0.5 mL Intramuscular Once   sodium chloride 10 mL Intravenous Q12H   venlafaxine  mg Oral Daily        •  acetaminophen **OR** acetaminophen **OR** acetaminophen  •  clonazePAM  •  ondansetron  •  sodium chloride    Family history:  History reviewed. No pertinent family history.    Social history:  Social History     Tobacco Use   • Smoking status: Former Smoker     Last attempt to quit: 10/10/1984     Years since quittin.0   • Smokeless tobacco: Never Used   Substance Use Topics   • Alcohol use: Yes     Alcohol/week: 16.8 oz     Types: 28 Standard drinks or equivalent per week   • Drug use: No       Review of systems:      Positive for:  fever  Negative for:  Fevers, chills, blurry vision, headaches, sore throat, hearing loss, enlarged cervical lymph nodes, chest pain, shortness of breath, palpitations, wheezing, diarrhea, constipation, hematochezia, depressed mood, anxiety, rash, joint pain, weakness, numbness.    Objective:  TMax 24 hours:   Temp (24hrs), Av.3 °F (36.8 °C), Min:97 °F (36.1 °C), Max:99.8 °F (37.7 °C)      Vitals Ranges:   Temp:  [97 °F (36.1 °C)-99.8 °F (37.7 °C)] 97 °F (36.1 °C)  Heart Rate:  [] 80  Resp:  [16-20] 20  BP: (118-139)/(68-79) 118/73    Intake/Output Last 3 shifts:  I/O last 3 completed shifts:  In: 340 [P.O.:340]  Out: -      Physical Exam:    General Appearance:    Alert, cooperative, NAD   HEENT:    No trauma, pupils reactive, hearing intact   Back:     No CVA tenderness   Lungs:     Respirations unlabored, no wheezing    Heart:    RRR, intact peripheral pulses   Abdomen:     Soft, NDNT, no masses, no guarding   :    Testes descended bilaterally. Left testis moderately tender. Penis normal.  No scrotal or penile rashes noted    Extremities:   No edema, no deformity   Lymphatic:   No neck or groin LAD   Skin:   No bleeding, bruising or rashes   Neuro/Psych:   Orientation intact, mood/affect pleasant, no focal findings       Results review:   I reviewed the patient's new clinical results.    Data review:  Lab Results (last 24 hours)     Procedure Component Value Units Date/Time    CBC Auto Differential [[id number]]  (Abnormal) Collected:  10/08/19 0423    Specimen:  Blood Updated:  10/08/19 0538     WBC 6.14 10*3/mm3      RBC 4.01 10*6/mm3      Hemoglobin 12.7 g/dL      Hematocrit 36.7 %      MCV 91.5 fL      MCH 31.7 pg      MCHC 34.6 g/dL      RDW 12.1 %      RDW-SD 41.0 fl      MPV 9.3 fL      Platelets 148 10*3/mm3     Manual Differential [[id number]]  (Abnormal) Collected:  10/08/19 0423    Specimen:  Blood Updated:  10/08/19 0538     Neutrophil % 63.0 %      Lymphocyte % 18.0 %      Monocyte % 17.0 %      Basophil % 2.0 %      Neutrophils Absolute 3.87 10*3/mm3      Lymphocytes Absolute 1.11 10*3/mm3      Monocytes Absolute 1.04 10*3/mm3      Basophils Absolute 0.12 10*3/mm3      RBC Morphology Normal     WBC Morphology Normal     Platelet Morphology Normal    Basic Metabolic Panel [[id number]] Collected:  10/08/19 0423    Specimen:  Blood Updated:  10/08/19 0530    CBC & Differential [[id number]] Collected:  10/07/19 0941    Specimen:  Blood Updated:  10/07/19 1032    Narrative:       The following orders were created for panel order CBC & Differential.  Procedure                               Abnormality         Status                     ---------                               -----------         ------                     CBC Auto Differential[[id number]]        Abnormal            Final result                 Please view results for these tests on the individual orders.    CBC Auto Differential [[id number]]  (Abnormal) Collected:  10/07/19 0941    Specimen:  Blood Updated:  10/07/19 1032     WBC 7.61 10*3/mm3      RBC 4.20 10*6/mm3       Hemoglobin 13.2 g/dL      Hematocrit 39.0 %      MCV 92.9 fL      MCH 31.4 pg      MCHC 33.8 g/dL      RDW 12.7 %      RDW-SD 43.0 fl      MPV 9.6 fL      Platelets 153 10*3/mm3      Neutrophil % 73.8 %      Lymphocyte % 7.1 %      Monocyte % 17.9 %      Eosinophil % 0.4 %      Basophil % 0.3 %      Immature Grans % 0.5 %      Neutrophils, Absolute 5.62 10*3/mm3      Lymphocytes, Absolute 0.54 10*3/mm3      Monocytes, Absolute 1.36 10*3/mm3      Eosinophils, Absolute 0.03 10*3/mm3      Basophils, Absolute 0.02 10*3/mm3      Immature Grans, Absolute 0.04 10*3/mm3      nRBC 0.0 /100 WBC     Basic Metabolic Panel [[id number]]  (Abnormal) Collected:  10/07/19 0941    Specimen:  Blood Updated:  10/07/19 1014     Glucose 143 mg/dL      BUN 18 mg/dL      Creatinine 1.02 mg/dL      Sodium 130 mmol/L      Potassium 4.1 mmol/L      Chloride 95 mmol/L      CO2 23.9 mmol/L      Calcium 8.6 mg/dL      eGFR Non African Amer 72 mL/min/1.73      BUN/Creatinine Ratio 17.6     Anion Gap 11.1 mmol/L     Narrative:       GFR Normal >60  Chronic Kidney Disease <60  Kidney Failure <15    Lactic Acid, Plasma [[id number]]  (Normal) Collected:  10/07/19 0941    Specimen:  Blood Updated:  10/07/19 1006     Lactate 0.6 mmol/L            Imaging:  Imaging Results (last 24 hours)     ** No results found for the last 24 hours. **             Assessment:       Gram-negative bacteremia    UTI (urinary tract infection)    BPH with obstruction/lower urinary tract symptoms    Neurogenic bladder    H/O left nephrectomy    HTN (hypertension)    Hyponatremia      Plan:     - self catheterization every 3 hours  - continue broad spectrum antibiotics  - ct abd/pelvis  - will follow    Catarino Weber Jr., MD  10/08/19  6:06 AM

## 2019-10-09 VITALS
OXYGEN SATURATION: 99 % | TEMPERATURE: 98.1 F | SYSTOLIC BLOOD PRESSURE: 138 MMHG | HEART RATE: 69 BPM | RESPIRATION RATE: 16 BRPM | DIASTOLIC BLOOD PRESSURE: 92 MMHG | HEIGHT: 70 IN | WEIGHT: 210.32 LBS | BODY MASS INDEX: 30.11 KG/M2

## 2019-10-09 LAB
ANION GAP SERPL CALCULATED.3IONS-SCNC: 10.7 MMOL/L (ref 5–15)
BUN BLD-MCNC: 15 MG/DL (ref 8–23)
BUN/CREAT SERPL: 17 (ref 7–25)
CALCIUM SPEC-SCNC: 9.4 MG/DL (ref 8.6–10.5)
CHLORIDE SERPL-SCNC: 96 MMOL/L (ref 98–107)
CO2 SERPL-SCNC: 27.3 MMOL/L (ref 22–29)
CREAT BLD-MCNC: 0.88 MG/DL (ref 0.76–1.27)
GFR SERPL CREATININE-BSD FRML MDRD: 85 ML/MIN/1.73
GLUCOSE BLD-MCNC: 104 MG/DL (ref 65–99)
POTASSIUM BLD-SCNC: 4.1 MMOL/L (ref 3.5–5.2)
SODIUM BLD-SCNC: 134 MMOL/L (ref 136–145)

## 2019-10-09 PROCEDURE — 80048 BASIC METABOLIC PNL TOTAL CA: CPT | Performed by: INTERNAL MEDICINE

## 2019-10-09 PROCEDURE — C1751 CATH, INF, PER/CENT/MIDLINE: HCPCS

## 2019-10-09 PROCEDURE — 25010000002 ERTAPENEM PER 500 MG: Performed by: NURSE PRACTITIONER

## 2019-10-09 PROCEDURE — 4A02X4A MEASUREMENT OF CARDIAC ELECTRICAL ACTIVITY, GUIDANCE, EXTERNAL APPROACH: ICD-10-PCS | Performed by: HOSPITALIST

## 2019-10-09 PROCEDURE — 02HV33Z INSERTION OF INFUSION DEVICE INTO SUPERIOR VENA CAVA, PERCUTANEOUS APPROACH: ICD-10-PCS | Performed by: HOSPITALIST

## 2019-10-09 RX ORDER — SODIUM CHLORIDE 0.9 % (FLUSH) 0.9 %
10 SYRINGE (ML) INJECTION EVERY 12 HOURS SCHEDULED
Status: DISCONTINUED | OUTPATIENT
Start: 2019-10-09 | End: 2019-10-09 | Stop reason: HOSPADM

## 2019-10-09 RX ORDER — SODIUM CHLORIDE 0.9 % (FLUSH) 0.9 %
20 SYRINGE (ML) INJECTION AS NEEDED
Status: DISCONTINUED | OUTPATIENT
Start: 2019-10-09 | End: 2019-10-09 | Stop reason: HOSPADM

## 2019-10-09 RX ORDER — SODIUM CHLORIDE 0.9 % (FLUSH) 0.9 %
10 SYRINGE (ML) INJECTION AS NEEDED
Status: DISCONTINUED | OUTPATIENT
Start: 2019-10-09 | End: 2019-10-09 | Stop reason: HOSPADM

## 2019-10-09 RX ADMIN — SODIUM CHLORIDE, PRESERVATIVE FREE 10 ML: 5 INJECTION INTRAVENOUS at 09:33

## 2019-10-09 RX ADMIN — AMLODIPINE BESYLATE 10 MG: 10 TABLET ORAL at 09:34

## 2019-10-09 RX ADMIN — ERTAPENEM SODIUM 1 G: 1 INJECTION, POWDER, LYOPHILIZED, FOR SOLUTION INTRAMUSCULAR; INTRAVENOUS at 05:28

## 2019-10-09 RX ADMIN — SENNOSIDES AND DOCUSATE SODIUM 1 TABLET: 8.6; 5 TABLET ORAL at 09:34

## 2019-10-09 RX ADMIN — VENLAFAXINE HYDROCHLORIDE 150 MG: 150 CAPSULE, EXTENDED RELEASE ORAL at 09:34

## 2019-10-09 NOTE — DISCHARGE SUMMARY
Patient Name: Lazarus Roe  : 1945  MRN: [id number]    Date of Admission: 10/7/2019  Date of Discharge:  10/9/2019  Primary Care Physician: Kimberlee Nicholas APRN      Chief Complaint:   Fever      Discharge Diagnoses     Active Hospital Problems    Diagnosis  POA   • **Gram-negative bacteremia [R78.81]  Yes   • ESBL (extended spectrum beta-lactamase) producing bacteria infection [A49.9, Z16.12]  Unknown   • BPH with obstruction/lower urinary tract symptoms [N40.1, N13.8]  Yes   • Neurogenic bladder [N31.9]  Yes   • H/O left nephrectomy [Z90.5]  Not Applicable   • HTN (hypertension) [I10]  Yes   • Hyponatremia [E87.1]  Unknown   • UTI (urinary tract infection) [N39.0]  Yes      Resolved Hospital Problems   No resolved problems to display.        Hospital Course     Mr. Roe is a 74 y.o. male with a history of neurogenic bladder, hypertension and status post left nephrectomy who presented to [facility] initially complaining of fever.  Please see the admitting history and physical for further details.  He was found to have ESBL UTI/bacteremia and was admitted to the hospital for further evaluation and treatment.  Infectious disease saw in consultation for antibiotic recommendations.  Patient will be sent home with a PICC line and will be on IV ertapenem 1 g daily for 2 weeks.  He will need weekly CBC and CMP.  Abnormal results are to be called to Dr. Solis at [phone].  Urology saw in consultation and recommends to self cath every 3 hours and follow-up with them in 2 to 3 weeks.  He was found to be hyponatremic during hospital course probably multifactorial from initial dehydration and sepsis.  This is resolved.  Patient will be discharged home in stable condition.      Day of Discharge     No new complaints, ready to go home    Denies chest pain, palpitations, SOA, edema, fever, chills, nausea vomiting.    Physical Exam:  Temp:  [96.9 °F (36.1 °C)-98.8 °F (37.1 °C)] 98.1 °F  (36.7 °C)  Heart Rate:  [68-69] 69  Resp:  [16-18] 16  BP: (128-138)/(74-92) 138/92  Body mass index is 30.18 kg/m².  Physical Exam   Constitutional: He is oriented to person, place, and time. He appears well-developed and well-nourished. No distress.   HENT:   Head: Normocephalic and atraumatic.   Eyes: Conjunctivae and EOM are normal.   Neck: Normal range of motion. Neck supple.   Cardiovascular: Normal rate and regular rhythm.   Pulmonary/Chest: Effort normal and breath sounds normal. No respiratory distress.   Abdominal: Soft. Bowel sounds are normal. He exhibits no distension. There is no tenderness.   Musculoskeletal: Normal range of motion. He exhibits no edema.   Neurological: He is alert and oriented to person, place, and time.   Skin: Skin is warm and dry.   Psychiatric: He has a normal mood and affect. His behavior is normal.   Nursing note and vitals reviewed.      Consultants     Consult Orders (all) (From admission, onward)    Start     Ordered    10/08/19 1302  Inpatient Infectious Diseases Consult  Once     Specialty:  Infectious Diseases  Provider:  Jade Solis MD    10/08/19 1302    10/07/19 1648  Inpatient Urology Consult  Once     Specialty:  Urology  Provider:  Catarino Weber Jr., MD    10/07/19 1648        Consulting Physician(s)     Provider Relationship Specialty    Catarino Weber Jr., MD Consulting Physician Urology    Maurice Perkins MD Consulting Physician Hospitalist    Jade Solis MD Consulting Physician Infectious Diseases        Procedures     * Surgery not found *    Imaging Results (all)     Procedure Component Value Units Date/Time    CT Abdomen Pelvis With & Without Contrast [[id number]] Collected:  10/08/19 1021     Updated:  10/08/19 1022    Narrative:       CT ABDOMEN AND PELVIS WITH AND WITHOUT CONTRAST     HISTORY: Recurrent/complicated urinary tract infection. Abdominal pain.     TECHNIQUE: Noncontrast axial images of the abdomen and pelvis were  obtained  followed by administration of intravenous contrast and imaging  of the abdomen and pelvis in the nephrographic and delayed phase.  Coronal and sagittal reformats were obtained.     COMPARISON: None     FINDINGS:There has been a prior left nephrectomy. The right kidney is  normal in size and attenuation. No renal calculi or hydronephrosis is  imaged. No suspicious renal mass is seen. Small low attenuating  peripelvic cyst is seen within the midpole of the right kidney measuring  1.8 cm. The right ureter demonstrates normal caliber.     The urinary bladder demonstrates circumferential wall thickening. The  liver demonstrates normal attenuation. Numerous hypoattenuating lesions  of the liver are favored to represent simple cysts. No intrahepatic  biliary dilatation. The gallbladder, pancreas is normal. The spleen is  unremarkable. Low-density area seen medially along the spleen, image 36  may represent postop change. No evidence of bowel obstruction. Colonic  diverticulosis is present. There is haziness within the root of the  small bowel mesentery with subcentimeter mesenteric lymphadenopathy. No  pathological retroperitoneal lymph nodes. Moderate calcified  atherosclerotic plaque is seen within the abdominal aorta and its  branches.       Impression:       1. Irregular and diffuse circumferential bladder wall thickening.  Cystitis is considered likely. No renal calculi or hydronephrosis.  2. Haziness within the root of the small bowel mesentery with  subcentimeter lymphadenopathy. The findings are most suggestive of  mesenteric panniculitis and follow-up CT abdomen and pelvis is  recommended in one year in the absence of remote imaging available for  comparison.  3. Colonic diverticulosis.     Radiation dose reduction techniques were utilized, including automated  exposure control and exposure modulation based on body size.                Results for orders placed during the hospital encounter of 10/10/18   Duplex  Venous Lower Extremity - Bilateral    Narrative · There was superficial venous valvular incompetence noted in the left   saphenofemoral junction.  · All other veins appeared normal bilaterally.             Pertinent Labs     Results from last 7 days   Lab Units 10/08/19  0423 10/07/19  0941 10/06/19  1338   WBC 10*3/mm3 6.14 7.61 8.37   HEMOGLOBIN g/dL 12.7* 13.2 15.0   PLATELETS 10*3/mm3 148 153 188     Results from last 7 days   Lab Units 10/09/19  0438 10/08/19  0423 10/07/19  0941 10/06/19  1338   SODIUM mmol/L 134* 128* 130* 130*   POTASSIUM mmol/L 4.1 3.8 4.1 4.3   CHLORIDE mmol/L 96* 92* 95* 94*   CO2 mmol/L 27.3 23.8 23.9 23.4   BUN mg/dL 15 15 18 15   CREATININE mg/dL 0.88 0.94 1.02 1.09   GLUCOSE mg/dL 104* 133* 143* 139*   Estimated Creatinine Clearance: 86.7 mL/min (by C-G formula based on SCr of 0.88 mg/dL).  Results from last 7 days   Lab Units 10/06/19  1338   ALBUMIN g/dL 4.20   BILIRUBIN mg/dL 0.8   ALK PHOS U/L 78   AST (SGOT) U/L 21   ALT (SGPT) U/L 18     Results from last 7 days   Lab Units 10/09/19  0438 10/08/19  0423 10/07/19  0941 10/06/19  1338   CALCIUM mg/dL 9.4 8.3* 8.6 8.7   ALBUMIN g/dL  --   --   --  4.20               Invalid input(s): LDLCALC  Results from last 7 days   Lab Units 10/08/19  1129 10/08/19  1042 10/06/19  1427 10/06/19  1416 10/06/19  1400   BLOODCX  No growth at 24 hours No growth at 24 hours Escherichia coli ESBL*  --  Escherichia coli ESBL*   URINECX   --   --   --  >100,000 CFU/mL Escherichia coli ESBL*  --    BCIDPCR   --   --  Escherichia coli. Identification by BCID PCR.*  --   --        Test Results Pending at Discharge   None   Order Current Status    Blood Culture - Blood, Arm, Left Preliminary result    Blood Culture - Blood, Hand, Right Preliminary result          Discharge Details        Discharge Medications      New Medications      Instructions Start Date   ertapenem 1 gm/100ml solution IV  Commonly known as:  INVanz   1 g, Intravenous, Every 24 Hours    Start Date:  10/10/2019        Continue These Medications      Instructions Start Date   amLODIPine 10 MG tablet  Commonly known as:  NORVASC   10 mg, Oral, Daily      CLONAZEPAM PO   1 mg, Oral, Nightly PRN      MULTIVITAMIN ADULT PO   Oral      venlafaxine  MG 24 hr capsule  Commonly known as:  EFFEXOR-XR   150 mg, Oral, Daily         Stop These Medications    cephalexin 500 MG capsule  Commonly known as:  KEFLEX            No Known Allergies      Discharge Disposition:  Home or Self Care    Discharge Diet:  Diet Order   Procedures   • Diet Regular       Discharge Activity:   Activity Instructions     Activity as Tolerated            CODE STATUS:    Code Status and Medical Interventions:   Ordered at: 10/07/19 1115     Level Of Support Discussed With:    Patient     Code Status:    CPR     Medical Interventions (Level of Support Prior to Arrest):    Full       No future appointments.  Additional Instructions for the Follow-ups that You Need to Schedule     Discharge Follow-up with PCP   As directed       Currently Documented PCP:    Kimberlee Nicholas APRN    PCP Phone Number:    [phone]     Follow Up Details:  1-2 weeks         Discharge Follow-up with Specified Provider: Dr. Weber; 2 Weeks   As directed      To:  Dr. Weber    Follow Up:  2 Weeks           Follow-up Information     Kimberlee Nicholas APRN Follow up.    Specialty:  Nurse Practitioner  Why:  1-2 weeks  Contact information:  [address] [address]  [phone]             Catarino Weber Jr., MD Follow up in 2 week(s).    Specialty:  Urology  Contact information:  [address] [place] [address] [place] [address] [phone]                   Additional Instructions for the Follow-ups that You Need to Schedule     Discharge Follow-up with PCP   As directed       Currently Documented PCP:    Kimberlee Nicholas APRN    PCP Phone Number:    [phone]     Follow Up Details:  1-2 weeks         Discharge  Follow-up with Specified Provider: Dr. Weber; 2 Weeks   As directed      To:  Dr. Weber    Follow Up:  2 Weeks           Time Spent on Discharge:  Greater than 30 minutes      ABDOULAYE Chun  [facility]  10/09/19  3:47 PM

## 2019-10-09 NOTE — PROGRESS NOTES
"  Infectious Diseases Progress Note    Jade Solis MD     [facility]  [place]: 2 days  Patient Identification:  Name: Lazarus Roe  Age: 74 y.o.  Sex: male  :  1945  MRN: [id number]         Primary Care Physician: Kimberlee Nicholas APRN            Subjective: Feeling a lot better denies any fever or chills.  Still weak but thinks that he is getting stronger.  Has lots of questions about him being able to play golf trip to [place] and also looking forward to play around a golf with his son at [facility] this Saturday.  Still have some residual discomfort in the right testicle area.  Interval History: See consultation note.    Objective:    Scheduled Meds:  amLODIPine 10 mg Oral Daily   ertapenem 1 g Intravenous Q24H   sennosides-docusate sodium 1 tablet Oral BID   sodium chloride 10 mL Intravenous Q12H   venlafaxine  mg Oral Daily     Continuous Infusions:     Vital signs in last 24 hours:  Temp:  [96.9 °F (36.1 °C)-98.8 °F (37.1 °C)] 96.9 °F (36.1 °C)  Heart Rate:  [68-72] 69  Resp:  [18] 18  BP: (124-131)/(72-78) 128/76    Intake/Output:    Intake/Output Summary (Last 24 hours) at 10/9/2019 0817  Last data filed at 10/9/2019 0425  Gross per 24 hour   Intake 1540 ml   Output --   Net 1540 ml       Exam:  /76 (BP Location: Left arm, Patient Position: Lying)   Pulse 69   Temp 96.9 °F (36.1 °C) (Oral)   Resp 18   Ht 177.8 cm (70\")   Wt 95.4 kg (210 lb 5.1 oz)   SpO2 97%   BMI 30.18 kg/m²     General Appearance:    Alert, cooperative, no distress, AAOx3                          Head:    Normocephalic, without obvious abnormality, atraumatic                           Eyes:    PERRL, conjunctivae/corneas clear, EOM's intact, both eyes                         Throat:   Lips, tongue, gums normal; oral mucosa pink and moist                           Neck:   Supple, symmetrical, trachea midline, no JVD                         Lungs:    Clear to auscultation bilaterally, " respirations unlabored                 Chest Wall:    No tenderness or deformity                          Heart:    Regular rate and rhythm, S1 and S2 normal                  Abdomen:     Soft, non-tender, bowel sounds active                Extremities:   Extremities normal, atraumatic, no cyanosis or edema                        Pulses:   Pulses palpable in all extremities                            Skin:   Skin is warm and dry,  no rashes or palpable lesions                  Neurologic: Grossly nonfocal       Data Review:    I reviewed the patient's new clinical results.  Results from last 7 days   Lab Units 10/08/19  0423 10/07/19  0941 10/06/19  1338   WBC 10*3/mm3 6.14 7.61 8.37   HEMOGLOBIN g/dL 12.7* 13.2 15.0   PLATELETS 10*3/mm3 148 153 188     Results from last 7 days   Lab Units 10/09/19  0438 10/08/19  0423 10/07/19  0941 10/06/19  1338   SODIUM mmol/L 134* 128* 130* 130*   POTASSIUM mmol/L 4.1 3.8 4.1 4.3   CHLORIDE mmol/L 96* 92* 95* 94*   CO2 mmol/L 27.3 23.8 23.9 23.4   BUN mg/dL 15 15 18 15   CREATININE mg/dL 0.88 0.94 1.02 1.09   CALCIUM mg/dL 9.4 8.3* 8.6 8.7   GLUCOSE mg/dL 104* 133* 143* 139*     Microbiology Results (last 10 days)     Procedure Component Value - Date/Time    Blood Culture - Blood, Arm, Left [[id number]]  (Abnormal)  (Susceptibility) Collected:  10/06/19 1427    Lab Status:  Final result Specimen:  Blood from Arm, Left Updated:  10/08/19 2305     Blood Culture Escherichia coli ESBL     Comment:   Consider infectious disease consult.  Susceptibility results may not correlate to clinical outcomes.  For ESBL-producing infections in the blood, a carbapenem is recommended as first-line therapy for optimal clinical outcomes.        Gram Stain Anaerobic Bottle Gram negative bacilli    Susceptibility      Escherichia coli ESBL     LAISHA     Ertapenem Susceptible     Meropenem Susceptible                    Blood Culture ID, PCR - Blood, Arm, Left [[id number]]  (Abnormal) Collected:   10/06/19 1427    Lab Status:  Final result Specimen:  Blood from Arm, Left Updated:  10/07/19 1255     BCID, PCR Escherichia coli. Identification by BCID PCR.    Urine Culture - Urine, Urine, Catheter In/Out [[id number]]  (Abnormal)  (Susceptibility) Collected:  10/06/19 1416    Lab Status:  Edited Result - FINAL Specimen:  Urine, Catheter In/Out Updated:  10/08/19 0822     Urine Culture >100,000 CFU/mL Escherichia coli ESBL     Comment:   Consider infectious disease consult.  Susceptibility results may not correlate to clinical outcomes.       Susceptibility      Escherichia coli ESBL     LAISHA     Ertapenem Susceptible (C) [1]      Gentamicin Susceptible     Levofloxacin Resistant     Meropenem Susceptible     Nitrofurantoin Susceptible     Piperacillin + Tazobactam Susceptible     Tetracycline Susceptible     Trimethoprim + Sulfamethoxazole Susceptible            [1]   Appended report. These results have been appended to a previously final verified report.                 Blood Culture - Blood, Arm, Right [[id number]]  (Abnormal) Collected:  10/06/19 1400    Lab Status:  Final result Specimen:  Blood from Arm, Right Updated:  10/08/19 2305     Blood Culture Escherichia coli ESBL     Comment:   Consider infectious disease consult.  Susceptibility results may not correlate to clinical outcomes.  For ESBL-producing infections in the blood, a carbapenem is recommended as first-line therapy for optimal clinical outcomes.        Gram Stain Anaerobic Bottle Gram negative bacilli    Narrative:       Refer to culture collected 10/6/2019 at 1427 for LAISHA's.        PSA level within normal limits    Assessment:  1-sepsis with ESBL positive E. coli UTI and bacteremia likely exacerbated by trauma due to self-catheterization for neurogenic bladder.  Possible evolving epididymoorchitis.  2-ruled out possible underlying acute prostatitis -PSA normal   3-history of left nephrectomy  4-other diagnoses per internal medicine  service        Recommendations/Discussions:   · Since PSA level is normal would recommend couple of weeks of antibiotic treatment with either IV or IM ertapenem.    · Antibiotics can be administered at home with PICC line or midline or he can come to [facility] and get it administered on a daily basis.  · Request for antibiotic arrangements have been made with CCP.  · Once antibiotics arranged patient can be discharged from infectious disease standpoint.    Jade Solis MD  10/9/2019  8:17 AM    Much of this encounter note is an electronic transcription/translation of spoken language to printed text. The electronic translation of spoken language may permit erroneous, or at times, nonsensical words or phrases to be inadvertently transcribed; Although I have reviewed the note for such errors, some may still exist

## 2019-10-09 NOTE — PLAN OF CARE
Problem: Patient Care Overview  Goal: Plan of Care Review  Outcome: Ongoing (interventions implemented as appropriate)   10/09/19 0351   Coping/Psychosocial   Plan of Care Reviewed With patient   Plan of Care Review   Progress no change   OTHER   Outcome Summary Up ad renay . Blood cultures + for ESBL as well as urine culture. IV INVANZ cont. Dr. Solis called about blood cultures tonight. NA+ tabls given for low sodium.        Problem: Infection, Risk/Actual (Adult)  Goal: Infection Prevention/Resolution  Outcome: Ongoing (interventions implemented as appropriate)

## 2019-10-09 NOTE — SIGNIFICANT NOTE
Single lumen PICC placed at bedside.. Tip in SVC verified by 3CG       10/09/19 1940   PICC Single Lumen 10/09/19 Right Brachial   Placement Date/Time: 10/09/19 1939   Hand Hygiene Completed: Yes  Size (Fr): 4  Description (optional): LOT# [id number] exp 7/31/2020  Length (cm): 42 cm  Orientation: Right  Location: Brachial  Site Prep: Chlorhexidine  All 5 Sterile Barriers Used (Cristine...   Site Assessment Clean;Dry;Intact   #1 Lumen Status Blood return noted;Capped;Flushed;Normal saline locked   Length iván (cm) 0 cm   Line Care Cap changed;Connections checked and tightened   Extremity Circumference (cm) 37 cm   Dressing Type Border Dressing;Transparent;Securing device;Antimicrobial dressing/disc   Dressing Status Clean;Dry;Intact   Dressing Intervention New dressing   Liquid Adhesive Contraindicated (comment)   Dressing Change Due 10/16/19   Indication/Daily Review of Necessity intravenous medication therapy;blood sampling

## 2019-10-09 NOTE — PROGRESS NOTES
"   LOS: 2 days   Patient Care Team:  Kimberlee Nicholas APRN as PCP - General (Nurse Practitioner)      Subjective   Interval History: No fevers overnight. Comfortable. Increased catheterization frequency to q3h.    Objective     ROS   12 POINT NEG ROS PERTINENT IN HPI      Vital Signs  Temp:  [96.9 °F (36.1 °C)-98.8 °F (37.1 °C)] 96.9 °F (36.1 °C)  Heart Rate:  [68-73] 69  Resp:  [18-20] 18  BP: (124-133)/(72-78) 128/76      Intake/Output Summary (Last 24 hours) at 10/9/2019 0637  Last data filed at 10/9/2019 0425  Gross per 24 hour   Intake 1540 ml   Output --   Net 1540 ml       Flowsheet Rows      First Filed Value   Admission Height  177.8 cm (70\") Documented at 10/07/2019 0901   Admission Weight  95.4 kg (210 lb 5.1 oz) Documented at 10/07/2019 0905          Physical Exam:     General damaris: alert, cooperative, oriented  Skin:  Skin color, texture, turgor, normal no rashes        Results Review:     I reviewed the patient's new clinical results.  Lab Results (all)     Procedure Component Value Units Date/Time    Basic Metabolic Panel [[id number]]  (Abnormal) Collected:  10/09/19 0438    Specimen:  Blood from Arm, Left Updated:  10/09/19 0542     Glucose 104 mg/dL      BUN 15 mg/dL      Creatinine 0.88 mg/dL      Sodium 134 mmol/L      Potassium 4.1 mmol/L      Chloride 96 mmol/L      CO2 27.3 mmol/L      Calcium 9.4 mg/dL      eGFR Non African Amer 85 mL/min/1.73      BUN/Creatinine Ratio 17.0     Anion Gap 10.7 mmol/L     Narrative:       GFR Normal >60  Chronic Kidney Disease <60  Kidney Failure <15    PSA DIAGNOSTIC [[id number]]  (Normal) Collected:  10/08/19 0423    Specimen:  Blood Updated:  10/08/19 2055     PSA 0.304 ng/mL     Blood Culture - Blood, Hand, Right [[id number]] Collected:  10/08/19 1129    Specimen:  Blood from Hand, Right Updated:  10/08/19 1150    Blood Culture - Blood, Arm, Left [[id number]] Collected:  10/08/19 1042    Specimen:  Blood from Arm, Left Updated:  10/08/19 1058    Basic Metabolic " Panel [[id number]]  (Abnormal) Collected:  10/08/19 0423    Specimen:  Blood Updated:  10/08/19 0631     Glucose 133 mg/dL      BUN 15 mg/dL      Creatinine 0.94 mg/dL      Sodium 128 mmol/L      Potassium 3.8 mmol/L      Chloride 92 mmol/L      CO2 23.8 mmol/L      Calcium 8.3 mg/dL      eGFR Non African Amer 79 mL/min/1.73      BUN/Creatinine Ratio 16.0     Anion Gap 12.2 mmol/L     Narrative:       GFR Normal >60  Chronic Kidney Disease <60  Kidney Failure <15    CBC Auto Differential [[id number]]  (Abnormal) Collected:  10/08/19 0423    Specimen:  Blood Updated:  10/08/19 0538     WBC 6.14 10*3/mm3      RBC 4.01 10*6/mm3      Hemoglobin 12.7 g/dL      Hematocrit 36.7 %      MCV 91.5 fL      MCH 31.7 pg      MCHC 34.6 g/dL      RDW 12.1 %      RDW-SD 41.0 fl      MPV 9.3 fL      Platelets 148 10*3/mm3     Manual Differential [[id number]]  (Abnormal) Collected:  10/08/19 0423    Specimen:  Blood Updated:  10/08/19 0538     Neutrophil % 63.0 %      Lymphocyte % 18.0 %      Monocyte % 17.0 %      Basophil % 2.0 %      Neutrophils Absolute 3.87 10*3/mm3      Lymphocytes Absolute 1.11 10*3/mm3      Monocytes Absolute 1.04 10*3/mm3      Basophils Absolute 0.12 10*3/mm3      RBC Morphology Normal     WBC Morphology Normal     Platelet Morphology Normal    CBC & Differential [[id number]] Collected:  10/07/19 0941    Specimen:  Blood Updated:  10/07/19 1032    Narrative:       The following orders were created for panel order CBC & Differential.  Procedure                               Abnormality         Status                     ---------                               -----------         ------                     CBC Auto Differential[[id number]]        Abnormal            Final result                 Please view results for these tests on the individual orders.    CBC Auto Differential [[id number]]  (Abnormal) Collected:  10/07/19 0941    Specimen:  Blood Updated:  10/07/19 1032     WBC 7.61 10*3/mm3      RBC 4.20 10*6/mm3       Hemoglobin 13.2 g/dL      Hematocrit 39.0 %      MCV 92.9 fL      MCH 31.4 pg      MCHC 33.8 g/dL      RDW 12.7 %      RDW-SD 43.0 fl      MPV 9.6 fL      Platelets 153 10*3/mm3      Neutrophil % 73.8 %      Lymphocyte % 7.1 %      Monocyte % 17.9 %      Eosinophil % 0.4 %      Basophil % 0.3 %      Immature Grans % 0.5 %      Neutrophils, Absolute 5.62 10*3/mm3      Lymphocytes, Absolute 0.54 10*3/mm3      Monocytes, Absolute 1.36 10*3/mm3      Eosinophils, Absolute 0.03 10*3/mm3      Basophils, Absolute 0.02 10*3/mm3      Immature Grans, Absolute 0.04 10*3/mm3      nRBC 0.0 /100 WBC     Basic Metabolic Panel [[id number]]  (Abnormal) Collected:  10/07/19 0941    Specimen:  Blood Updated:  10/07/19 1014     Glucose 143 mg/dL      BUN 18 mg/dL      Creatinine 1.02 mg/dL      Sodium 130 mmol/L      Potassium 4.1 mmol/L      Chloride 95 mmol/L      CO2 23.9 mmol/L      Calcium 8.6 mg/dL      eGFR Non African Amer 72 mL/min/1.73      BUN/Creatinine Ratio 17.6     Anion Gap 11.1 mmol/L     Narrative:       GFR Normal >60  Chronic Kidney Disease <60  Kidney Failure <15    Lactic Acid, Plasma [[id number]]  (Normal) Collected:  10/07/19 0941    Specimen:  Blood Updated:  10/07/19 1006     Lactate 0.6 mmol/L           Imaging Results (all)     Procedure Component Value Units Date/Time    CT Abdomen Pelvis With & Without Contrast [[id number]] Collected:  10/08/19 1021     Updated:  10/08/19 1022    Narrative:       CT ABDOMEN AND PELVIS WITH AND WITHOUT CONTRAST     HISTORY: Recurrent/complicated urinary tract infection. Abdominal pain.     TECHNIQUE: Noncontrast axial images of the abdomen and pelvis were  obtained followed by administration of intravenous contrast and imaging  of the abdomen and pelvis in the nephrographic and delayed phase.  Coronal and sagittal reformats were obtained.     COMPARISON: None     FINDINGS:There has been a prior left nephrectomy. The right kidney is  normal in size and attenuation. No renal  calculi or hydronephrosis is  imaged. No suspicious renal mass is seen. Small low attenuating  peripelvic cyst is seen within the midpole of the right kidney measuring  1.8 cm. The right ureter demonstrates normal caliber.     The urinary bladder demonstrates circumferential wall thickening. The  liver demonstrates normal attenuation. Numerous hypoattenuating lesions  of the liver are favored to represent simple cysts. No intrahepatic  biliary dilatation. The gallbladder, pancreas is normal. The spleen is  unremarkable. Low-density area seen medially along the spleen, image 36  may represent postop change. No evidence of bowel obstruction. Colonic  diverticulosis is present. There is haziness within the root of the  small bowel mesentery with subcentimeter mesenteric lymphadenopathy. No  pathological retroperitoneal lymph nodes. Moderate calcified  atherosclerotic plaque is seen within the abdominal aorta and its  branches.       Impression:       1. Irregular and diffuse circumferential bladder wall thickening.  Cystitis is considered likely. No renal calculi or hydronephrosis.  2. Haziness within the root of the small bowel mesentery with  subcentimeter lymphadenopathy. The findings are most suggestive of  mesenteric panniculitis and follow-up CT abdomen and pelvis is  recommended in one year in the absence of remote imaging available for  comparison.  3. Colonic diverticulosis.     Radiation dose reduction techniques were utilized, including automated  exposure control and exposure modulation based on body size.                Medication Review:   Current Facility-Administered Medications   Medication Dose Route Frequency Provider Last Rate Last Dose   • acetaminophen (TYLENOL) tablet 650 mg  650 mg Oral Q4H PRN Susie Harden APRN        Or   • acetaminophen (TYLENOL) 160 MG/5ML solution 650 mg  650 mg Oral Q4H PRN Susie Harden APRN        Or   • acetaminophen (TYLENOL) suppository 650 mg  650 mg  Rectal Q4H PRN Susie Harden APRMAREN       • amLODIPine (NORVASC) tablet 10 mg  10 mg Oral Daily Susie Harden APRN   10 mg at 10/08/19 0911   • clonazePAM (KlonoPIN) tablet 1 mg  1 mg Oral BID PRN Leopoldo Mena MD   1 mg at 10/08/19 2100   • ertapenem (INVanz) 1 g/100 mL 0.9% NS VTB (mbp)  1 g Intravenous Q24H Mara Ross APRN   1 g at 10/09/19 0528   • ondansetron (ZOFRAN) injection 4 mg  4 mg Intravenous Q6H PRN Susie Harden APRN       • sennosides-docusate sodium (SENOKOT-S) 8.6-50 MG tablet 1 tablet  1 tablet Oral BID Leopoldo Mena MD   1 tablet at 10/08/19 2100   • sodium chloride 0.9 % flush 10 mL  10 mL Intravenous Q12H Susie Harden APRN   10 mL at 10/08/19 2100   • sodium chloride 0.9 % flush 10 mL  10 mL Intravenous PRN Susie Harden APRN       • venlafaxine XR (EFFEXOR-XR) 24 hr capsule 150 mg  150 mg Oral Daily Susie Harden APRN   150 mg at 10/08/19 0911       Current Facility-Administered Medications:   •  acetaminophen (TYLENOL) tablet 650 mg, 650 mg, Oral, Q4H PRN **OR** acetaminophen (TYLENOL) 160 MG/5ML solution 650 mg, 650 mg, Oral, Q4H PRN **OR** acetaminophen (TYLENOL) suppository 650 mg, 650 mg, Rectal, Q4H PRN, Susie Harden APRN  •  amLODIPine (NORVASC) tablet 10 mg, 10 mg, Oral, Daily, Susie Harden APRN, 10 mg at 10/08/19 0911  •  clonazePAM (KlonoPIN) tablet 1 mg, 1 mg, Oral, BID PRN, Leopoldo Mena MD, 1 mg at 10/08/19 2100  •  ertapenem (INVanz) 1 g/100 mL 0.9% NS VTB (mbp), 1 g, Intravenous, Q24H, Mara Ross APRN, 1 g at 10/09/19 0528  •  ondansetron (ZOFRAN) injection 4 mg, 4 mg, Intravenous, Q6H PRN, Susie Harden APRN  •  sennosides-docusate sodium (SENOKOT-S) 8.6-50 MG tablet 1 tablet, 1 tablet, Oral, BID, Leopoldo Mena MD, 1 tablet at 10/08/19 2100  •  sodium chloride 0.9 % flush 10 mL, 10 mL, Intravenous, Q12H, Susie Harden APRN, 10 mL at 10/08/19 2100  •  sodium chloride  0.9 % flush 10 mL, 10 mL, Intravenous, PRN, Susie Harden APRN  •  venlafaxine XR (EFFEXOR-XR) 24 hr capsule 150 mg, 150 mg, Oral, Daily, Susie Harden APRN, 150 mg at 10/08/19 0911  Medications Discontinued During This Encounter   Medication Reason   • sulfamethoxazole-trimethoprim (BACTRIM DS) 800-160 MG per tablet    • Tadalafil (CIALIS PO)    • tamsulosin (FLOMAX) 0.4 MG capsule 24 hr capsule    • cefTRIAXone (ROCEPHIN) IVPB 1 g    • BUPROPION HCL PO    • lactated ringers infusion    • cefTRIAXone (ROCEPHIN) IVPB 2 g        Assessment/Plan         Gram-negative bacteremia    UTI (urinary tract infection)    BPH with obstruction/lower urinary tract symptoms    Neurogenic bladder    H/O left nephrectomy    HTN (hypertension)    Hyponatremia    ESBL (extended spectrum beta-lactamase) producing bacteria infection        Plan   - self cath q3h  - Continue antibiotics per ID recs  - will arrange follow up in office in 2-3 weeks    Catarino Weber Jr., MD  10/09/19  6:37 AM

## 2019-10-09 NOTE — CONSULTS
CONSULT NOTE    Infectious Diseases - Jade Angulo MD  [facility]       Patient Identification:  Name: Lazarus Roe  Age: 74 y.o.  Sex: male  :  1945  MRN: [id number]             Date of Consultation: 10/8/2019      Primary Care Physician: Kimberlee Nicholas APRN                               Requesting Physician: Dr. Mena  Reason for Consultation: ESBL positive E. coli sepsis with bacteremia.    Impression: 74-year-old male with past medical history remarkable for neurogenic bladder requiring self-catheterization, history of left nephrectomy hypertension was in his usual state of his health until 10/4/2019 and he started having fever chills and not feeling very well.  His temperature at home was 103.  Because of progressive worsening and fever not getting any better he decided to come to the emergency room on 10/6/2019.  Patient did recall having some trouble self-catheterization on Friday before the onset of the symptoms and did notice some blood in the urine.  In the emergency room on 10/6/2019 patient was found to have 104.2 fever with tachycardia heart rate 132 with stable blood pressure.  He had abnormal urinalysis and normal white blood cell count.  Patient had blood cultures drawn was given a dose of ceftriaxone and was discharged on p.o. Keflex with instructions to follow-up with Dr. Weber which he was scheduled for Monday the seventh of this month.  Next day patient blood culture Did come back positive resulting in patient being recalled to the emergency room and was admitted.  Patient was given IV Rocephin and later switched to IV ertapenem on 10/7/2019 once urine culture was reported as E. coli of ESBL positive type.  Patient is clinically improving his temperature is much better.  Repeat blood cultures are drawn earlier today and infectious disease service is consulted for recommendations regarding subsequent antibiotic treatment as duration and if he needs any  further work-up.  Patient currently denies any other symptoms and feels a lot better.  CT scan of the abdomen and pelvis did not reveal any abnormality of the right kidney.  Patient had left nephrectomy.  Bladder wall thickening consistent with cystitis noted.  This presentation is consistent with:  1-sepsis with ESBL positive E. coli UTI and bacteremia likely exacerbated by trauma due to self-catheterization for neurogenic bladder.  Possible evolving epididymoorchitis.  2-rule out possible underlying acute prostatitis explaining this bacteremic UTI as it has a bearing on duration of antibiotic treatment.    3-history of left nephrectomy  4-other diagnoses per internal medicine service      Recommendations/Discussions: Agree with care plan consisting of IV ertapenem while following up on the repeat blood culture results.  In order to decide about duration of antibiotic treatment underlying infectious syndrome has to be clearly identified.  Patient would need couple weeks of antibiotic treatment if there is no visceral involvement such as pyelonephritis or prostatitis.  Will check PSA level and if it is normal would recommend couple of weeks of antibiotic treatment with either IV or IM ertapenem.  Antibiotics can be administered at home with PICC line or midline or he can come to [facility]U and get it administered on a daily basis.  Thank you Dr. Mena for letting me be the part of your patient care please see above impression and recommendations.        History of Present Illness:   74-year-old male with past medical history remarkable for neurogenic bladder requiring self-catheterization, history of left nephrectomy hypertension was in his usual state of his health until 10/4/2019 and he started having fever chills and not feeling very well.  His temperature at home was 103.  Because of progressive worsening and fever not getting any better he decided to come to the emergency room on 10/6/2019.  Patient did recall  having some trouble self-catheterization on Friday before the onset of the symptoms and did notice some blood in the urine.  In the emergency room on 10/6/2019 patient was found to have 104.2 fever with tachycardia heart rate 132 with stable blood pressure.  He had abnormal urinalysis and normal white blood cell count.  Patient had blood cultures drawn was given a dose of ceftriaxone and was discharged on p.o. Keflex with instructions to follow-up with Dr. Weber which he was scheduled for Monday the seventh of this month.  Next day patient blood culture Did come back positive resulting in patient being recalled to the emergency room and was admitted.  Patient was given IV Rocephin and later switched to IV ertapenem on 10/7/2019 once urine culture was reported as E. coli of ESBL positive type.  Patient is clinically improving his temperature is much better.  Repeat blood cultures are drawn earlier today and infectious disease service is consulted for recommendations regarding subsequent antibiotic treatment as duration and if he needs any further work-up.  Patient currently denies any other symptoms and feels a lot better.  CT scan of the abdomen and pelvis did not reveal any abnormality of the right kidney.  Patient had left nephrectomy.  Bladder wall thickening consistent with cystitis noted.       Past Medical History:  Past Medical History:   Diagnosis Date   • BPH with obstruction/lower urinary tract symptoms 10/7/2019   • Depression    • H/O left nephrectomy 10/7/2019   • History of nephrectomy     Left   • HTN (hypertension) 10/7/2019   • Hypertension    • Hyponatremia 10/7/2019   • Mitral valve prolapse    • Neurogenic bladder 10/7/2019   • Renal mass    • Self-catheterizes urinary bladder    • Sepsis (CMS/HCC)    • UTI (urinary tract infection)      Past Surgical History:  Past Surgical History:   Procedure Laterality Date   • HERNIA REPAIR     • NEPHRECTOMY Left    • ROTATOR CUFF REPAIR Left    •  TONSILLECTOMY     • TURP / TRANSURETHRAL INCISION / DRAINAGE PROSTATE        Home Meds:  Medications Prior to Admission   Medication Sig Dispense Refill Last Dose   • amLODIPine (NORVASC) 10 MG tablet Take 10 mg by mouth Daily.   10/7/2019 at Unknown time   • cephalexin (KEFLEX) 500 MG capsule Take 1 capsule by mouth 3 (Three) Times a Day. 21 capsule 0 10/7/2019 at Unknown time   • CLONAZEPAM PO Take 1 mg by mouth At Night As Needed.   10/6/2019 at Unknown time   • Multiple Vitamins-Minerals (MULTIVITAMIN ADULT PO) Take  by mouth.   10/7/2019 at Unknown time   • venlafaxine XR (EFFEXOR-XR) 150 MG 24 hr capsule Take 150 mg by mouth Daily.   10/7/2019 at Unknown time     Current Meds:     Current Facility-Administered Medications:   •  acetaminophen (TYLENOL) tablet 650 mg, 650 mg, Oral, Q4H PRN **OR** acetaminophen (TYLENOL) 160 MG/5ML solution 650 mg, 650 mg, Oral, Q4H PRN **OR** acetaminophen (TYLENOL) suppository 650 mg, 650 mg, Rectal, Q4H PRN, Susie Harden APRN  •  amLODIPine (NORVASC) tablet 10 mg, 10 mg, Oral, Daily, Susie Harden APRN, 10 mg at 10/08/19 0911  •  clonazePAM (KlonoPIN) tablet 1 mg, 1 mg, Oral, BID PRN, Leopoldo Mena MD, 1 mg at 10/07/19 2004  •  ertapenem (INVanz) 1 g/100 mL 0.9% NS VTB (mbp), 1 g, Intravenous, Q24H, Mara Ross APRN, 1 g at 10/08/19 0405  •  ondansetron (ZOFRAN) injection 4 mg, 4 mg, Intravenous, Q6H PRN, Susie Harden APRN  •  sennosides-docusate sodium (SENOKOT-S) 8.6-50 MG tablet 1 tablet, 1 tablet, Oral, BID, Leopoldo Mena MD, 1 tablet at 10/08/19 1545  •  sodium chloride 0.9 % flush 10 mL, 10 mL, Intravenous, Q12H, Susie Harden APRN, 10 mL at 10/08/19 1246  •  sodium chloride 0.9 % flush 10 mL, 10 mL, Intravenous, PRN, Susie Harden APRN  •  sodium chloride 0.9 % infusion, 100 mL/hr, Intravenous, Continuous, Leopoldo Mena MD, Last Rate: 100 mL/hr at 10/08/19 1545, 100 mL/hr at 10/08/19 1545  •  venlafaxine XR  "(EFFEXOR-XR) 24 hr capsule 150 mg, 150 mg, Oral, Daily, Susie Harden APRN, 150 mg at 10/08/19 0911  Allergies:  No Known Allergies  Social History:   Social History     Tobacco Use   • Smoking status: Former Smoker     Last attempt to quit: 10/10/1984     Years since quittin.0   • Smokeless tobacco: Never Used   Substance Use Topics   • Alcohol use: Yes     Alcohol/week: 16.8 oz     Types: 28 Standard drinks or equivalent per week      Family History:  History reviewed. No pertinent family history.       Review of Systems  See history of present illness and past medical history.    Feeling a lot better now but at the time of admission his review system was as follows:   Constitutional: Positive for chills and fever.   HENT: Negative for congestion and dental problem.    Eyes: Negative.    Respiratory: Negative for cough and shortness of breath.    Cardiovascular: Negative.    Gastrointestinal: Positive for abdominal pain. Negative for abdominal distention, nausea and vomiting.   Endocrine: Negative.    Genitourinary: Positive for difficulty urinating and flank pain.   Musculoskeletal: Negative for back pain and gait problem.   Skin: Negative.    Allergic/Immunologic: Negative for environmental allergies and food allergies.   Neurological: Negative for dizziness and headaches.   Hematological: Negative.    Psychiatric/Behavioral: Negative for agitation and behavioral problems.   Remainder of ROS is negative.      Vitals:   /78 (BP Location: Left arm, Patient Position: Lying)   Pulse 69   Temp 98.8 °F (37.1 °C) (Oral)   Resp 18   Ht 177.8 cm (70\")   Wt 95.4 kg (210 lb 5.1 oz)   SpO2 98%   BMI 30.18 kg/m²   I/O:     Intake/Output Summary (Last 24 hours) at 10/8/2019 2003  Last data filed at 10/8/2019 1842  Gross per 24 hour   Intake 720 ml   Output --   Net 720 ml     Exam:  General Appearance:    Alert, cooperative, no distress, appears stated age   Head:    Normocephalic, without obvious " abnormality, atraumatic   Eyes:    PERRL, conjunctivae/corneas clear, EOM's intact, both eyes   Ears:    Normal external ear canals, both ears   Nose:   Nares normal, septum midline, mucosa normal, no drainage    or sinus tenderness   Throat:   Lips, tongue, gums normal; oral mucosa pink and moist   Neck:   Supple, symmetrical, trachea midline, no adenopathy;     thyroid:  no enlargement/tenderness/nodules; no carotid    bruit or JVD   Back:     Symmetric, no curvature, ROM normal, no CVA tenderness   Lungs:     Clear to auscultation bilaterally, respirations unlabored   Chest Wall:    No tenderness or deformity    Heart:    Regular rate and rhythm, S1 and S2 normal, no murmur, rub   or gallop   Abdomen:     Soft, non-tender, bowel sounds active all four quadrants,     no masses, no hepatomegaly, no splenomegaly   Extremities:   Extremities normal, atraumatic, no cyanosis or edema   Pulses:   Pulses palpable in all extremities; symmetric all extremities   Skin:   Skin color normal, Skin is warm and dry,  no rashes or palpable lesions   Neurologic:  Grossly nonfocal       Data Review:    I reviewed the patient's new clinical results.  Results from last 7 days   Lab Units 10/08/19  0423 10/07/19  0941 10/06/19  1338   WBC 10*3/mm3 6.14 7.61 8.37   HEMOGLOBIN g/dL 12.7* 13.2 15.0   PLATELETS 10*3/mm3 148 153 188     Results from last 7 days   Lab Units 10/08/19  0423 10/07/19  0941 10/06/19  1338   SODIUM mmol/L 128* 130* 130*   POTASSIUM mmol/L 3.8 4.1 4.3   CHLORIDE mmol/L 92* 95* 94*   CO2 mmol/L 23.8 23.9 23.4   BUN mg/dL 15 18 15   CREATININE mg/dL 0.94 1.02 1.09   CALCIUM mg/dL 8.3* 8.6 8.7   GLUCOSE mg/dL 133* 143* 139*     Culture   Date Value Ref Range Status   09/26/2018 NO GROWTH 5 DAYS  Final   ]  Microbiology Results (last 10 days)     Procedure Component Value - Date/Time    Blood Culture - Blood, Arm, Left [[id number]]  (Abnormal)  (Susceptibility) Collected:  10/06/19 1424    Lab Status:  Final result  Specimen:  Blood from Arm, Left Updated:  10/08/19 2305     Blood Culture Escherichia coli ESBL     Comment:   Consider infectious disease consult.  Susceptibility results may not correlate to clinical outcomes.  For ESBL-producing infections in the blood, a carbapenem is recommended as first-line therapy for optimal clinical outcomes.        Gram Stain Anaerobic Bottle Gram negative bacilli    Susceptibility      Escherichia coli ESBL     LAISHA     Ertapenem Susceptible     Meropenem Susceptible                    Blood Culture ID, PCR - Blood, Arm, Left [[id number]]  (Abnormal) Collected:  10/06/19 1427    Lab Status:  Final result Specimen:  Blood from Arm, Left Updated:  10/07/19 1255     BCID, PCR Escherichia coli. Identification by BCID PCR.    Urine Culture - Urine, Urine, Catheter In/Out [[id number]]  (Abnormal)  (Susceptibility) Collected:  10/06/19 1416    Lab Status:  Edited Result - FINAL Specimen:  Urine, Catheter In/Out Updated:  10/08/19 0822     Urine Culture >100,000 CFU/mL Escherichia coli ESBL     Comment:   Consider infectious disease consult.  Susceptibility results may not correlate to clinical outcomes.       Susceptibility      Escherichia coli ESBL     LAISHA     Ertapenem Susceptible (C) [1]      Gentamicin Susceptible     Levofloxacin Resistant     Meropenem Susceptible     Nitrofurantoin Susceptible     Piperacillin + Tazobactam Susceptible     Tetracycline Susceptible     Trimethoprim + Sulfamethoxazole Susceptible            [1]   Appended report. These results have been appended to a previously final verified report.                 Blood Culture - Blood, Arm, Right [[id number]]  (Abnormal) Collected:  10/06/19 1400    Lab Status:  Final result Specimen:  Blood from Arm, Right Updated:  10/08/19 2305     Blood Culture Escherichia coli ESBL     Comment:   Consider infectious disease consult.  Susceptibility results may not correlate to clinical outcomes.  For ESBL-producing infections in the  blood, a carbapenem is recommended as first-line therapy for optimal clinical outcomes.        Gram Stain Anaerobic Bottle Gram negative bacilli    Narrative:       Refer to culture collected 10/6/2019 at 1427 for LAISHA's.        Ct Abdomen Pelvis With & Without Contrast    Result Date: 10/8/2019  1. Irregular and diffuse circumferential bladder wall thickening. Cystitis is considered likely. No renal calculi or hydronephrosis. 2. Haziness within the root of the small bowel mesentery with subcentimeter lymphadenopathy. 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 3. Colonic diverticulosis.  Radiation dose reduction techniques were utilized, including automated exposure control and exposure modulation based on body size.           Assessment:  Active Hospital Problems    Diagnosis  POA   • **Gram-negative bacteremia [R78.81]  Yes   • ESBL (extended spectrum beta-lactamase) producing bacteria infection [A49.9, Z16.12]  Unknown   • BPH with obstruction/lower urinary tract symptoms [N40.1, N13.8]  Yes   • Neurogenic bladder [N31.9]  Yes   • H/O left nephrectomy [Z90.5]  Not Applicable   • HTN (hypertension) [I10]  Yes   • Hyponatremia [E87.1]  Unknown   • UTI (urinary tract infection) [N39.0]  Yes      Resolved Hospital Problems   No resolved problems to display.         Plan:  See above  Jade Solis MD   10/8/2019  8:03 PM    Much of this encounter note is an electronic transcription/translation of spoken language to printed text. The electronic translation of spoken language may permit erroneous, or at times, nonsensical words or phrases to be inadvertently transcribed; Although I have reviewed the note for such errors, some may still exist

## 2019-10-09 NOTE — PROGRESS NOTES
Continued Stay Note  [facility]     Patient Name: Lazarus Roe  MRN: [id number]  Today's Date: 10/9/2019    Admit Date: 10/7/2019    Discharge Plan     Row Name 10/09/19 1657       Plan    Plan  Home with [facility] infusion and waiting for orders for the patient to have PICC line care and labs at [facility].     Plan Comments  Spoke to the patient at bedside who stated that he would not be homebound and would be agreeable to using [facility] infusion for his IV antibiotics.  CCP spoke to pharmacist Otis [phone] who stated that the patient's co-pay would be $500.  [facility] informed the patient who was agreeable to the cost.  The patient states that he felt that he and his wife would be able to be taught how to administer the medication and would be agreeable to coming to [facility] for PICC line changes and labs.  Telephone call made to Dr. Solis's answering service [phone] to obtain orders for the patient to receive services at [facility] for PICC line changes and labs.  JUANY Sahu        Discharge Codes    No documentation.       Expected Discharge Date and Time     Expected Discharge Date Expected Discharge Time    Oct 9, 2019             JUANY Bush

## 2019-10-10 DIAGNOSIS — R78.81 BACTEREMIA DUE TO ESCHERICHIA COLI: Primary | ICD-10-CM

## 2019-10-10 DIAGNOSIS — B96.20 BACTEREMIA DUE TO ESCHERICHIA COLI: Primary | ICD-10-CM

## 2019-10-10 NOTE — PROGRESS NOTES
Case Management Discharge Note    Final Note: Home with [facility] infusion and PICC line care and labs at [facility].  JUANY Sahu    Destination      No service has been selected for the patient.      Durable Medical Equipment      No service has been selected for the patient.      Dialysis/Infusion - Selection Complete      Service Provider Request Status Selected Services Address Phone Number Fax Number    [facility] [address], [place] [id number] [phone]      Home Medical Care      No service has been selected for the patient.      Therapy      No service has been selected for the patient.      Community Resources      No service has been selected for the patient.        Transportation Services  Private: Car    Final Discharge Disposition Code: 01 - home or self-care

## 2019-10-13 LAB
BACTERIA SPEC AEROBE CULT: NORMAL
BACTERIA SPEC AEROBE CULT: NORMAL

## 2019-10-16 ENCOUNTER — HOSPITAL ENCOUNTER (OUTPATIENT)
Dept: INFUSION THERAPY | Facility: HOSPITAL | Age: 74
Discharge: HOME | End: 2019-10-16
Admitting: INTERNAL MEDICINE

## 2019-10-16 VITALS
OXYGEN SATURATION: 98 % | SYSTOLIC BLOOD PRESSURE: 129 MMHG | TEMPERATURE: 98.4 F | HEART RATE: 75 BPM | RESPIRATION RATE: 20 BRPM | DIASTOLIC BLOOD PRESSURE: 75 MMHG

## 2019-10-16 DIAGNOSIS — R78.81 BACTEREMIA DUE TO ESCHERICHIA COLI: ICD-10-CM

## 2019-10-16 DIAGNOSIS — B96.20 BACTEREMIA DUE TO ESCHERICHIA COLI: ICD-10-CM

## 2019-10-16 LAB
ALBUMIN SERPL-MCNC: 4.3 G/DL (ref 3.5–5.2)
ALBUMIN/GLOB SERPL: 1.4 G/DL
ALP SERPL-CCNC: 91 U/L (ref 39–117)
ALT SERPL W P-5'-P-CCNC: 84 U/L (ref 1–41)
ANION GAP SERPL CALCULATED.3IONS-SCNC: 15.4 MMOL/L (ref 5–15)
AST SERPL-CCNC: 62 U/L (ref 1–40)
BILIRUB SERPL-MCNC: 0.3 MG/DL (ref 0.2–1.2)
BUN BLD-MCNC: 20 MG/DL (ref 8–23)
BUN/CREAT SERPL: 19 (ref 7–25)
CALCIUM SPEC-SCNC: 9.3 MG/DL (ref 8.6–10.5)
CHLORIDE SERPL-SCNC: 99 MMOL/L (ref 98–107)
CO2 SERPL-SCNC: 24.6 MMOL/L (ref 22–29)
CREAT BLD-MCNC: 1.05 MG/DL (ref 0.76–1.27)
DEPRECATED RDW RBC AUTO: 42 FL (ref 37–54)
ERYTHROCYTE [DISTWIDTH] IN BLOOD BY AUTOMATED COUNT: 12.6 % (ref 12.3–15.4)
GFR SERPL CREATININE-BSD FRML MDRD: 69 ML/MIN/1.73
GLOBULIN UR ELPH-MCNC: 3.1 GM/DL
GLUCOSE BLD-MCNC: 99 MG/DL (ref 65–99)
HCT VFR BLD AUTO: 42.6 % (ref 37.5–51)
HGB BLD-MCNC: 14.2 G/DL (ref 13–17.7)
MCH RBC QN AUTO: 30.4 PG (ref 26.6–33)
MCHC RBC AUTO-ENTMCNC: 33.3 G/DL (ref 31.5–35.7)
MCV RBC AUTO: 91.2 FL (ref 79–97)
PLATELET # BLD AUTO: 360 10*3/MM3 (ref 140–450)
PMV BLD AUTO: 9.1 FL (ref 6–12)
POTASSIUM BLD-SCNC: 4.9 MMOL/L (ref 3.5–5.2)
PROT SERPL-MCNC: 7.4 G/DL (ref 6–8.5)
RBC # BLD AUTO: 4.67 10*6/MM3 (ref 4.14–5.8)
SODIUM BLD-SCNC: 139 MMOL/L (ref 136–145)
WBC NRBC COR # BLD: 8.75 10*3/MM3 (ref 3.4–10.8)

## 2019-10-16 PROCEDURE — 80053 COMPREHEN METABOLIC PANEL: CPT | Performed by: INTERNAL MEDICINE

## 2019-10-16 PROCEDURE — 36592 COLLECT BLOOD FROM PICC: CPT

## 2019-10-16 PROCEDURE — 85027 COMPLETE CBC AUTOMATED: CPT | Performed by: INTERNAL MEDICINE

## 2019-10-16 PROCEDURE — G0463 HOSPITAL OUTPT CLINIC VISIT: HCPCS

## 2019-10-16 NOTE — PROGRESS NOTES
When patient arrived the right picc line had no ultra site valve and the extension tubing was not clamped and had no cap. It appeared to have air in the extension tubing. The dressing was pulling up around the edges. The dressing was changed using sterile technique according to policy. Blood work was drawn and the line was flushed with 20 cc's normal saline. A bio patch and a stat lock was used and covered with a tegaderm. The site was dated. I applied an ultra site valve and an extension tubing which also had an ultrasite valve. A new netting was applied to keep picc protected. I spoke with the patient about keeping the ultra site valves intact. The patient stated he did make sure that he sanitized the area before hooking up his medication. The patient tolerated the procedure well.

## 2019-10-23 ENCOUNTER — HOSPITAL ENCOUNTER (OUTPATIENT)
Dept: INFUSION THERAPY | Facility: HOSPITAL | Age: 74
Discharge: HOME | End: 2019-10-23
Admitting: INTERNAL MEDICINE

## 2019-10-23 VITALS
HEART RATE: 72 BPM | TEMPERATURE: 97.9 F | OXYGEN SATURATION: 96 % | RESPIRATION RATE: 16 BRPM | SYSTOLIC BLOOD PRESSURE: 131 MMHG | DIASTOLIC BLOOD PRESSURE: 78 MMHG

## 2019-10-23 DIAGNOSIS — N39.0 URINARY TRACT INFECTION WITH HEMATURIA, SITE UNSPECIFIED: Primary | ICD-10-CM

## 2019-10-23 DIAGNOSIS — R31.9 URINARY TRACT INFECTION WITH HEMATURIA, SITE UNSPECIFIED: Primary | ICD-10-CM

## 2019-10-23 PROCEDURE — G0463 HOSPITAL OUTPT CLINIC VISIT: HCPCS

## 2019-10-23 NOTE — PROGRESS NOTES
Patient reclined. Right picc line dressing removed and site scrubbed with chlorhexadine. Patient asked to take deep breath and bare down. Picc removed easily 41 cm present. Pressure held at removal site manually for 2 minutes. Vaseline gaze applied then covered with 4x4 gauze and tegaderm and wrapped with co flex. Patient tolerated well. Discharged instruction discussed and a copy of the after visit summary was given to the patient. To remain reclined for 30 minutes.

## 2019-12-18 ENCOUNTER — OFFICE (OUTPATIENT)
Dept: URBAN - METROPOLITAN AREA CLINIC 75 | Facility: CLINIC | Age: 74
End: 2019-12-18

## 2019-12-18 VITALS
HEIGHT: 70 IN | DIASTOLIC BLOOD PRESSURE: 81 MMHG | HEART RATE: 67 BPM | WEIGHT: 211 LBS | SYSTOLIC BLOOD PRESSURE: 126 MMHG

## 2019-12-18 DIAGNOSIS — Z80.0 FAMILY HISTORY OF MALIGNANT NEOPLASM OF DIGESTIVE ORGANS: ICD-10-CM

## 2019-12-18 DIAGNOSIS — R19.4 CHANGE IN BOWEL HABIT: ICD-10-CM

## 2019-12-18 DIAGNOSIS — I10 ESSENTIAL (PRIMARY) HYPERTENSION: ICD-10-CM

## 2019-12-18 DIAGNOSIS — Z86.010 PERSONAL HISTORY OF COLONIC POLYPS: ICD-10-CM

## 2019-12-18 DIAGNOSIS — Z72.89 OTHER PROBLEMS RELATED TO LIFESTYLE: ICD-10-CM

## 2019-12-18 DIAGNOSIS — K57.30 DIVERTICULOSIS OF LARGE INTESTINE WITHOUT PERFORATION OR ABS: ICD-10-CM

## 2019-12-18 PROCEDURE — 99203 OFFICE O/P NEW LOW 30 MIN: CPT | Performed by: INTERNAL MEDICINE

## 2020-01-14 PROCEDURE — 96361 HYDRATE IV INFUSION ADD-ON: CPT

## 2020-01-14 PROCEDURE — P9612 CATHETERIZE FOR URINE SPEC: HCPCS

## 2020-01-14 PROCEDURE — 96365 THER/PROPH/DIAG IV INF INIT: CPT

## 2020-01-14 PROCEDURE — 99283 EMERGENCY DEPT VISIT LOW MDM: CPT

## 2020-01-14 RX ORDER — SODIUM CHLORIDE 0.9 % (FLUSH) 0.9 %
10 SYRINGE (ML) INJECTION AS NEEDED
Status: DISCONTINUED | OUTPATIENT
Start: 2020-01-14 | End: 2020-01-15 | Stop reason: HOSPADM

## 2020-01-15 ENCOUNTER — HOSPITAL ENCOUNTER (EMERGENCY)
Facility: HOSPITAL | Age: 75
Discharge: HOME | End: 2020-01-15
Attending: EMERGENCY MEDICINE | Admitting: EMERGENCY MEDICINE

## 2020-01-15 VITALS
DIASTOLIC BLOOD PRESSURE: 66 MMHG | HEART RATE: 77 BPM | WEIGHT: 216.4 LBS | RESPIRATION RATE: 16 BRPM | OXYGEN SATURATION: 94 % | BODY MASS INDEX: 30.98 KG/M2 | HEIGHT: 70 IN | TEMPERATURE: 101.7 F | SYSTOLIC BLOOD PRESSURE: 127 MMHG

## 2020-01-15 DIAGNOSIS — N39.0 URINARY TRACT INFECTION ASSOCIATED WITH CATHETERIZATION OF URINARY TRACT, UNSPECIFIED INDWELLING URINARY CATHETER TYPE, INITIAL ENCOUNTER (HCC): Primary | ICD-10-CM

## 2020-01-15 DIAGNOSIS — T83.511A URINARY TRACT INFECTION ASSOCIATED WITH CATHETERIZATION OF URINARY TRACT, UNSPECIFIED INDWELLING URINARY CATHETER TYPE, INITIAL ENCOUNTER (HCC): Primary | ICD-10-CM

## 2020-01-15 DIAGNOSIS — E87.1 HYPONATREMIA: ICD-10-CM

## 2020-01-15 LAB
ALBUMIN SERPL-MCNC: 4.2 G/DL (ref 3.5–5.2)
ALBUMIN/GLOB SERPL: 1.2 G/DL
ALP SERPL-CCNC: 69 U/L (ref 39–117)
ALT SERPL W P-5'-P-CCNC: 26 U/L (ref 1–41)
ANION GAP SERPL CALCULATED.3IONS-SCNC: 10.2 MMOL/L (ref 5–15)
ANION GAP SERPL CALCULATED.3IONS-SCNC: 11.6 MMOL/L (ref 5–15)
AST SERPL-CCNC: 25 U/L (ref 1–40)
BACTERIA UR QL AUTO: ABNORMAL /HPF
BASOPHILS # BLD AUTO: 0.04 10*3/MM3 (ref 0–0.2)
BASOPHILS NFR BLD AUTO: 0.4 % (ref 0–1.5)
BILIRUB SERPL-MCNC: 0.8 MG/DL (ref 0.2–1.2)
BILIRUB UR QL STRIP: NEGATIVE
BUN BLD-MCNC: 16 MG/DL (ref 8–23)
BUN BLD-MCNC: 18 MG/DL (ref 8–23)
BUN/CREAT SERPL: 14.2 (ref 7–25)
BUN/CREAT SERPL: 15 (ref 7–25)
CALCIUM SPEC-SCNC: 8.5 MG/DL (ref 8.6–10.5)
CALCIUM SPEC-SCNC: 9.1 MG/DL (ref 8.6–10.5)
CHLORIDE SERPL-SCNC: 92 MMOL/L (ref 98–107)
CHLORIDE SERPL-SCNC: 98 MMOL/L (ref 98–107)
CLARITY UR: ABNORMAL
CO2 SERPL-SCNC: 23.4 MMOL/L (ref 22–29)
CO2 SERPL-SCNC: 23.8 MMOL/L (ref 22–29)
COLOR UR: YELLOW
CREAT BLD-MCNC: 1.13 MG/DL (ref 0.76–1.27)
CREAT BLD-MCNC: 1.2 MG/DL (ref 0.76–1.27)
D-LACTATE SERPL-SCNC: 1.1 MMOL/L (ref 0.5–2)
DEPRECATED RDW RBC AUTO: 42.7 FL (ref 37–54)
EOSINOPHIL # BLD AUTO: 0.07 10*3/MM3 (ref 0–0.4)
EOSINOPHIL NFR BLD AUTO: 0.7 % (ref 0.3–6.2)
ERYTHROCYTE [DISTWIDTH] IN BLOOD BY AUTOMATED COUNT: 12.8 % (ref 12.3–15.4)
GFR SERPL CREATININE-BSD FRML MDRD: 59 ML/MIN/1.73
GFR SERPL CREATININE-BSD FRML MDRD: 64 ML/MIN/1.73
GLOBULIN UR ELPH-MCNC: 3.4 GM/DL
GLUCOSE BLD-MCNC: 119 MG/DL (ref 65–99)
GLUCOSE BLD-MCNC: 131 MG/DL (ref 65–99)
GLUCOSE UR STRIP-MCNC: NEGATIVE MG/DL
HCT VFR BLD AUTO: 39.9 % (ref 37.5–51)
HGB BLD-MCNC: 13.7 G/DL (ref 13–17.7)
HGB UR QL STRIP.AUTO: ABNORMAL
HYALINE CASTS UR QL AUTO: ABNORMAL /LPF
IMM GRANULOCYTES # BLD AUTO: 0.05 10*3/MM3 (ref 0–0.05)
IMM GRANULOCYTES NFR BLD AUTO: 0.5 % (ref 0–0.5)
KETONES UR QL STRIP: ABNORMAL
LEUKOCYTE ESTERASE UR QL STRIP.AUTO: ABNORMAL
LYMPHOCYTES # BLD AUTO: 0.89 10*3/MM3 (ref 0.7–3.1)
LYMPHOCYTES NFR BLD AUTO: 8.9 % (ref 19.6–45.3)
MCH RBC QN AUTO: 31.4 PG (ref 26.6–33)
MCHC RBC AUTO-ENTMCNC: 34.3 G/DL (ref 31.5–35.7)
MCV RBC AUTO: 91.3 FL (ref 79–97)
MONOCYTES # BLD AUTO: 1.4 10*3/MM3 (ref 0.1–0.9)
MONOCYTES NFR BLD AUTO: 14 % (ref 5–12)
NEUTROPHILS # BLD AUTO: 7.57 10*3/MM3 (ref 1.7–7)
NEUTROPHILS NFR BLD AUTO: 75.5 % (ref 42.7–76)
NITRITE UR QL STRIP: POSITIVE
NRBC BLD AUTO-RTO: 0 /100 WBC (ref 0–0.2)
PH UR STRIP.AUTO: 6 [PH] (ref 5–8)
PLATELET # BLD AUTO: 152 10*3/MM3 (ref 140–450)
PMV BLD AUTO: 9.7 FL (ref 6–12)
POTASSIUM BLD-SCNC: 3.7 MMOL/L (ref 3.5–5.2)
POTASSIUM BLD-SCNC: 3.9 MMOL/L (ref 3.5–5.2)
PROT SERPL-MCNC: 7.6 G/DL (ref 6–8.5)
PROT UR QL STRIP: ABNORMAL
RBC # BLD AUTO: 4.37 10*6/MM3 (ref 4.14–5.8)
RBC # UR: ABNORMAL /HPF
REF LAB TEST METHOD: ABNORMAL
SODIUM BLD-SCNC: 127 MMOL/L (ref 136–145)
SODIUM BLD-SCNC: 132 MMOL/L (ref 136–145)
SP GR UR STRIP: 1.01 (ref 1–1.03)
SQUAMOUS #/AREA URNS HPF: ABNORMAL /HPF
UROBILINOGEN UR QL STRIP: ABNORMAL
WBC NRBC COR # BLD: 10.02 10*3/MM3 (ref 3.4–10.8)
WBC UR QL AUTO: ABNORMAL /HPF

## 2020-01-15 PROCEDURE — 80053 COMPREHEN METABOLIC PANEL: CPT | Performed by: EMERGENCY MEDICINE

## 2020-01-15 PROCEDURE — 87088 URINE BACTERIA CULTURE: CPT | Performed by: EMERGENCY MEDICINE

## 2020-01-15 PROCEDURE — 81001 URINALYSIS AUTO W/SCOPE: CPT | Performed by: EMERGENCY MEDICINE

## 2020-01-15 PROCEDURE — 96361 HYDRATE IV INFUSION ADD-ON: CPT

## 2020-01-15 PROCEDURE — 87186 SC STD MICRODIL/AGAR DIL: CPT | Performed by: EMERGENCY MEDICINE

## 2020-01-15 PROCEDURE — 87086 URINE CULTURE/COLONY COUNT: CPT | Performed by: EMERGENCY MEDICINE

## 2020-01-15 PROCEDURE — 87040 BLOOD CULTURE FOR BACTERIA: CPT | Performed by: EMERGENCY MEDICINE

## 2020-01-15 PROCEDURE — 96365 THER/PROPH/DIAG IV INF INIT: CPT

## 2020-01-15 PROCEDURE — 85025 COMPLETE CBC W/AUTO DIFF WBC: CPT | Performed by: EMERGENCY MEDICINE

## 2020-01-15 PROCEDURE — 83605 ASSAY OF LACTIC ACID: CPT | Performed by: EMERGENCY MEDICINE

## 2020-01-15 PROCEDURE — 25010000002 CEFTRIAXONE PER 250 MG: Performed by: EMERGENCY MEDICINE

## 2020-01-15 RX ORDER — CEFTRIAXONE SODIUM 1 G/50ML
1 INJECTION, SOLUTION INTRAVENOUS ONCE
Status: COMPLETED | OUTPATIENT
Start: 2020-01-15 | End: 2020-01-15

## 2020-01-15 RX ORDER — SODIUM CHLORIDE 9 MG/ML
125 INJECTION, SOLUTION INTRAVENOUS CONTINUOUS
Status: DISCONTINUED | OUTPATIENT
Start: 2020-01-15 | End: 2020-01-15 | Stop reason: HOSPADM

## 2020-01-15 RX ADMIN — CEFTRIAXONE SODIUM 1 G: 1 INJECTION, SOLUTION INTRAVENOUS at 00:51

## 2020-01-15 RX ADMIN — SODIUM CHLORIDE 125 ML/HR: 9 INJECTION, SOLUTION INTRAVENOUS at 00:51

## 2020-01-15 RX ADMIN — SODIUM CHLORIDE 1000 ML: 9 INJECTION, SOLUTION INTRAVENOUS at 01:31

## 2020-01-15 RX ADMIN — SODIUM CHLORIDE 500 ML: 9 INJECTION, SOLUTION INTRAVENOUS at 00:47

## 2020-01-15 NOTE — ED TRIAGE NOTES
Pt to ED with c/o fever and cloudy urine starting 2 days ago.  Pt reports hx of neurogenic bladder, and he strictly self cath's.  Pt reports dropping [facility] off with [facility] yesterday, awaiting result.  Pt reports taking tylenol around 2130 and took 2 bactrim per protocol set up with [facility].

## 2020-01-15 NOTE — ED PROVIDER NOTES
EMERGENCY DEPARTMENT ENCOUNTER    CHIEF COMPLAINT  Chief Complaint: Fever  History given by: Patient  History limited by:   Room Number: 11/11  PMD: Kimberlee Nicholas APRN   Urologist: Dr. Weber      HPI:  Pt is a 74 y.o. male who presents complaining of fever that began this afternoon with Tmax of 101.7 in triage Over the past 2 days he reports having some UTI sxs with cloudy and malodorous urine. Pt feels that he may not be emptying bladder completely when self-cathing at home. Pt recently took UA to urologist office and is awaiting results. He started Bactrim as recommended by his urologist and Tylenol tonight at 2130. Pt denies any nausea or vomiting.      PAST MEDICAL HISTORY  Active Ambulatory Problems     Diagnosis Date Noted   • UTI (urinary tract infection) 10/12/2018   • Gram-negative bacteremia 10/07/2019   • BPH with obstruction/lower urinary tract symptoms 10/07/2019   • Neurogenic bladder 10/07/2019   • H/O left nephrectomy 10/07/2019   • HTN (hypertension) 10/07/2019   • Hyponatremia 10/07/2019   • ESBL (extended spectrum beta-lactamase) producing bacteria infection 10/08/2019     Resolved Ambulatory Problems     Diagnosis Date Noted   • No Resolved Ambulatory Problems     Past Medical History:   Diagnosis Date   • Depression    • History of nephrectomy    • Hypertension    • Mitral valve prolapse    • Renal mass    • Self-catheterizes urinary bladder    • Sepsis (CMS/HCC)        PAST SURGICAL HISTORY  Past Surgical History:   Procedure Laterality Date   • HERNIA REPAIR     • NEPHRECTOMY Left    • ROTATOR CUFF REPAIR Left    • TONSILLECTOMY     • TURP / TRANSURETHRAL INCISION / DRAINAGE PROSTATE         FAMILY HISTORY  History reviewed. No pertinent family history.    SOCIAL HISTORY  Social History     Socioeconomic History   • Marital status:      Spouse name: Not on file   • Number of children: Not on file   • Years of education: Not on file   • Highest education level: Not on file    Tobacco Use   • Smoking status: Former Smoker     Last attempt to quit: 10/10/1984     Years since quittin.2   • Smokeless tobacco: Never Used   Substance and Sexual Activity   • Alcohol use: Yes     Alcohol/week: 28.0 standard drinks     Types: 28 Standard drinks or equivalent per week   • Drug use: No   • Sexual activity: Defer       ALLERGIES  Patient has no known allergies.    REVIEW OF SYSTEMS  Review of Systems   Constitutional: Positive for fever. Negative for activity change and appetite change.   HENT: Negative for congestion and sore throat.    Eyes: Negative.    Respiratory: Negative for cough and shortness of breath.    Cardiovascular: Negative for chest pain and leg swelling.   Gastrointestinal: Negative for abdominal pain, diarrhea and vomiting.   Endocrine: Negative.    Genitourinary: Negative for decreased urine volume and dysuria.        Cloudy, malodorous urine   Musculoskeletal: Negative for neck pain.   Skin: Negative for rash and wound.   Allergic/Immunologic: Negative.    Neurological: Negative for weakness, numbness and headaches.   Hematological: Negative.    Psychiatric/Behavioral: Negative.    All other systems reviewed and are negative.      PHYSICAL EXAM  ED Triage Vitals   Temp Heart Rate Resp BP SpO2   20 2322 20   (!) 101.7 °F (38.7 °C) (!) 122 18 126/79 96 %      Temp src Heart Rate Source Patient Position BP Location FiO2 (%)   20 -- -- -- --   Tympanic           Physical Exam   Constitutional: He is oriented to person, place, and time. No distress.   HENT:   Head: Normocephalic and atraumatic.   Eyes: Pupils are equal, round, and reactive to light. EOM are normal.   Neck: Normal range of motion. Neck supple.   Cardiovascular: Normal rate, regular rhythm and normal heart sounds.   Pulmonary/Chest: Effort normal and breath sounds normal. No respiratory distress.   Abdominal: Soft. There is tenderness in the  suprapubic area. There is no rebound, no guarding and no CVA tenderness.   Musculoskeletal: Normal range of motion. He exhibits no edema.   Neurological: He is alert and oriented to person, place, and time. He has normal sensation and normal strength.   Skin: Skin is warm and dry.   Psychiatric: Mood and affect normal.   Nursing note and vitals reviewed.      LAB RESULTS  Lab Results (last 24 hours)     Procedure Component Value Units Date/Time    CBC & Differential [[id number]] Collected:  01/15/20 0030    Specimen:  Blood Updated:  01/15/20 0042    Narrative:       The following orders were created for panel order CBC & Differential.  Procedure                               Abnormality         Status                     ---------                               -----------         ------                     CBC Auto Differential[[id number]]        Abnormal            Final result                 Please view results for these tests on the individual orders.    Comprehensive Metabolic Panel [[id number]]  (Abnormal) Collected:  01/15/20 0030    Specimen:  Blood Updated:  01/15/20 0105     Glucose 131 mg/dL      BUN 18 mg/dL      Creatinine 1.20 mg/dL      Sodium 127 mmol/L      Potassium 3.7 mmol/L      Chloride 92 mmol/L      CO2 23.4 mmol/L      Calcium 9.1 mg/dL      Total Protein 7.6 g/dL      Albumin 4.20 g/dL      ALT (SGPT) 26 U/L      AST (SGOT) 25 U/L      Alkaline Phosphatase 69 U/L      Total Bilirubin 0.8 mg/dL      eGFR Non African Amer 59 mL/min/1.73      Globulin 3.4 gm/dL      A/G Ratio 1.2 g/dL      BUN/Creatinine Ratio 15.0     Anion Gap 11.6 mmol/L     Narrative:       GFR Normal >60  Chronic Kidney Disease <60  Kidney Failure <15      Blood Culture - Blood, Arm, Left [[id number]] Collected:  01/15/20 0030    Specimen:  Blood from Arm, Left Updated:  01/15/20 0035    Lactic Acid, Plasma [[id number]]  (Normal) Collected:  01/15/20 0030    Specimen:  Blood Updated:  01/15/20 0053     Lactate 1.1 mmol/L      CBC Auto Differential [[id number]]  (Abnormal) Collected:  01/15/20 0030    Specimen:  Blood Updated:  01/15/20 0042     WBC 10.02 10*3/mm3      RBC 4.37 10*6/mm3      Hemoglobin 13.7 g/dL      Hematocrit 39.9 %      MCV 91.3 fL      MCH 31.4 pg      MCHC 34.3 g/dL      RDW 12.8 %      RDW-SD 42.7 fl      MPV 9.7 fL      Platelets 152 10*3/mm3      Neutrophil % 75.5 %      Lymphocyte % 8.9 %      Monocyte % 14.0 %      Eosinophil % 0.7 %      Basophil % 0.4 %      Immature Grans % 0.5 %      Neutrophils, Absolute 7.57 10*3/mm3      Lymphocytes, Absolute 0.89 10*3/mm3      Monocytes, Absolute 1.40 10*3/mm3      Eosinophils, Absolute 0.07 10*3/mm3      Basophils, Absolute 0.04 10*3/mm3      Immature Grans, Absolute 0.05 10*3/mm3      nRBC 0.0 /100 WBC     Blood Culture - Blood, Hand, Right [[id number]] Collected:  01/15/20 0031    Specimen:  Blood from Hand, Right Updated:  01/15/20 0035    Urinalysis With Culture If Indicated - Urine, Catheter In/Out [[id number]]  (Abnormal) Collected:  01/15/20 0046    Specimen:  Urine, Catheter In/Out Updated:  01/15/20 0100     Color, UA Yellow     Appearance, UA Cloudy     pH, UA 6.0     Specific Gravity, UA 1.014     Glucose, UA Negative     Ketones, UA Trace     Bilirubin, UA Negative     Blood, UA Small (1+)     Protein,  mg/dL (2+)     Leuk Esterase, UA Large (3+)     Nitrite, UA Positive     Urobilinogen, UA 0.2 E.U./dL    Urinalysis, Microscopic Only - Urine, Catheter In/Out [[id number]]  (Abnormal) Collected:  01/15/20 0046    Specimen:  Urine, Catheter In/Out Updated:  01/15/20 0100     RBC, UA 6-12 /HPF      WBC, UA Too Numerous to Count /HPF      Bacteria, UA 4+ /HPF      Squamous Epithelial Cells, UA 0-2 /HPF      Hyaline Casts, UA 0-2 /LPF      Methodology Automated Microscopy    Urine Culture - Urine, Urine, Catheter In/Out [[id number]] Collected:  01/15/20 0046    Specimen:  Urine, Catheter In/Out Updated:  01/15/20 0100    Basic Metabolic Panel [[id number]]   "(Abnormal) Collected:  01/15/20 0329    Specimen:  Blood Updated:  01/15/20 0400     Glucose 119 mg/dL      BUN 16 mg/dL      Creatinine 1.13 mg/dL      Sodium 132 mmol/L      Potassium 3.9 mmol/L      Chloride 98 mmol/L      CO2 23.8 mmol/L      Calcium 8.5 mg/dL      eGFR Non African Amer 64 mL/min/1.73      BUN/Creatinine Ratio 14.2     Anion Gap 10.2 mmol/L     Narrative:       GFR Normal >60  Chronic Kidney Disease <60  Kidney Failure <15            I ordered the above labs and reviewed the results    PROCEDURES  Procedures      PROGRESS AND CONSULTS     1:50 AM  Rechecked with pt. Informed the pt of his labs showing UTI and hyponatremia, but no evidence of sepsis. Informed of plan to give fluids and abx. Pt understands and agrees with plan. All concerns were addressed.    4:08 AM  Sodium improved to 132. Will discharge. Instructed to drink gatorade and remain hydrated. Pt understands and agrees with plan. All concerns were addressed.        MEDICAL DECISION MAKING  Results were reviewed/discussed with the patient and they were also made aware of online access. Pt also made aware that some labs, such as cultures, will not be resulted during ER visit and follow up with PMD is necessary.     MDM  Number of Diagnoses or Management Options  Hyponatremia:   Urinary tract infection associated with catheterization of urinary tract, unspecified indwelling urinary catheter type, initial encounter (CMS/[facility]):      Amount and/or Complexity of Data Reviewed  Clinical lab tests: reviewed and ordered (UA: nitrate positive, 4+bacteria, TNTC WBC)  Decide to obtain previous medical records or to obtain history from someone other than the patient: yes  Review and summarize past medical records: (Last admission 10/7-10/9/19 for bacteremia    \"Mr. Roe is a 74 y.o. male with a history of neurogenic bladder, hypertension and status post left nephrectomy who presented to [facility] initially complaining of fever.  " "Please see the admitting history and physical for further details.  He was found to have ESBL UTI/bacteremia and was admitted to the hospital for further evaluation and treatment.  Infectious disease saw in consultation for antibiotic recommendations.  Patient will be sent home with a PICC line and will be on IV ertapenem 1 g daily for 2 weeks.  He will need weekly CBC and CMP.  Abnormal results are to be called to Dr. Solis at [phone].  Urology saw in consultation and recommends to self cath every 3 hours and follow-up with them in 2 to 3 weeks.  He was found to be hyponatremic during hospital course probably multifactorial from initial dehydration and sepsis.  This is resolved.  Patient will be discharged home in stable condition.\")           DIAGNOSIS  Final diagnoses:   Urinary tract infection associated with catheterization of urinary tract, unspecified indwelling urinary catheter type, initial encounter (CMS/[facility])   Hyponatremia       DISPOSITION  DISCHARGE    Patient discharged in stable condition.    Reviewed implications of results, diagnosis, meds, responsibility to follow up, warning signs and symptoms of possible worsening, potential complications and reasons to return to ER.    Patient/Family voiced understanding of above instructions.    Discussed plan for discharge, as there is no emergent indication for admission. Patient referred to primary care provider for BP management due to today's BP. Pt/family is agreeable and understands need for follow up and repeat testing.  Pt is aware that discharge does not mean that nothing is wrong but it indicates no emergency is present that requires admission and they must continue care with follow-up as given below or physician of their choice.     FOLLOW-UP  Catarino Weber Jr., MD  [address] [place] [address] [place] [facility]  [phone]    Schedule an appointment as soon as possible for a visit            Medication List      No changes were made to " your prescriptions during this visit.           Latest Documented Vital Signs:  As of 4:08 AM  BP- 133/86 HR- 79 Temp- (!) 101.7 °F (38.7 °C) (Tympanic) O2 sat- 94%    --  Documentation assistance provided by vi Castillo for Dr Dent.  Information recorded by the scribe was done at my direction and has been verified and validated by me.         Jaquan Castillo  01/15/20 0151       Jaquan Castillo  01/15/20 0409       Angel Dent MD  01/15/20 0601

## 2020-01-15 NOTE — ED NOTES
"Pt c/o fever and difficlty urinating. Pt self caths for hx neurogenic bladder and has frequent UTI's. Pt said he had \"chills\" this evening. Pt took tyelnol and bactrim pta. Pt alert and oriented times 4.      Milla Griffiths, RN  01/15/20 0035    "

## 2020-01-17 LAB — BACTERIA SPEC AEROBE CULT: ABNORMAL

## 2020-01-20 LAB
BACTERIA SPEC AEROBE CULT: NORMAL
BACTERIA SPEC AEROBE CULT: NORMAL

## 2020-02-12 VITALS
SYSTOLIC BLOOD PRESSURE: 135 MMHG | OXYGEN SATURATION: 94 % | RESPIRATION RATE: 13 BRPM | DIASTOLIC BLOOD PRESSURE: 90 MMHG | SYSTOLIC BLOOD PRESSURE: 156 MMHG | DIASTOLIC BLOOD PRESSURE: 82 MMHG | RESPIRATION RATE: 16 BRPM | OXYGEN SATURATION: 96 % | SYSTOLIC BLOOD PRESSURE: 146 MMHG | DIASTOLIC BLOOD PRESSURE: 91 MMHG | OXYGEN SATURATION: 90 % | HEART RATE: 76 BPM | HEART RATE: 115 BPM | OXYGEN SATURATION: 93 % | HEART RATE: 96 BPM | OXYGEN SATURATION: 98 % | HEART RATE: 62 BPM | DIASTOLIC BLOOD PRESSURE: 93 MMHG | DIASTOLIC BLOOD PRESSURE: 84 MMHG | RESPIRATION RATE: 14 BRPM | RESPIRATION RATE: 18 BRPM | RESPIRATION RATE: 12 BRPM | SYSTOLIC BLOOD PRESSURE: 148 MMHG | TEMPERATURE: 97.7 F | SYSTOLIC BLOOD PRESSURE: 155 MMHG | DIASTOLIC BLOOD PRESSURE: 80 MMHG | HEART RATE: 100 BPM | OXYGEN SATURATION: 97 % | SYSTOLIC BLOOD PRESSURE: 142 MMHG | HEART RATE: 99 BPM | HEART RATE: 106 BPM | HEART RATE: 68 BPM | SYSTOLIC BLOOD PRESSURE: 143 MMHG | DIASTOLIC BLOOD PRESSURE: 87 MMHG | TEMPERATURE: 98.8 F | HEART RATE: 98 BPM | OXYGEN SATURATION: 81 % | DIASTOLIC BLOOD PRESSURE: 89 MMHG | SYSTOLIC BLOOD PRESSURE: 140 MMHG | WEIGHT: 208 LBS | DIASTOLIC BLOOD PRESSURE: 99 MMHG | DIASTOLIC BLOOD PRESSURE: 83 MMHG | HEART RATE: 102 BPM | SYSTOLIC BLOOD PRESSURE: 144 MMHG | HEIGHT: 70 IN

## 2020-02-17 ENCOUNTER — OFFICE (OUTPATIENT)
Dept: URBAN - METROPOLITAN AREA PATHOLOGY 4 | Facility: PATHOLOGY | Age: 75
End: 2020-02-17
Payer: COMMERCIAL

## 2020-02-17 ENCOUNTER — AMBULATORY SURGICAL CENTER (OUTPATIENT)
Dept: URBAN - METROPOLITAN AREA SURGERY 17 | Facility: SURGERY | Age: 75
End: 2020-02-17
Payer: COMMERCIAL

## 2020-02-17 DIAGNOSIS — Z80.0 FAMILY HISTORY OF MALIGNANT NEOPLASM OF DIGESTIVE ORGANS: ICD-10-CM

## 2020-02-17 DIAGNOSIS — K63.3 ULCER OF INTESTINE: ICD-10-CM

## 2020-02-17 DIAGNOSIS — Z86.010 PERSONAL HISTORY OF COLONIC POLYPS: ICD-10-CM

## 2020-02-17 DIAGNOSIS — K64.1 SECOND DEGREE HEMORRHOIDS: ICD-10-CM

## 2020-02-17 DIAGNOSIS — K55.9 VASCULAR DISORDER OF INTESTINE, UNSPECIFIED: ICD-10-CM

## 2020-02-17 DIAGNOSIS — D12.2 BENIGN NEOPLASM OF ASCENDING COLON: ICD-10-CM

## 2020-02-17 DIAGNOSIS — K62.1 RECTAL POLYP: ICD-10-CM

## 2020-02-17 DIAGNOSIS — Z91.19 PATIENT'S NONCOMPLIANCE WITH OTHER MEDICAL TREATMENT AND REG: ICD-10-CM

## 2020-02-17 DIAGNOSIS — K57.33 DIVERTICULITIS OF LARGE INTESTINE WITHOUT PERFORATION OR ABS: ICD-10-CM

## 2020-02-17 DIAGNOSIS — K57.30 DIVERTICULOSIS OF LARGE INTESTINE WITHOUT PERFORATION OR ABS: ICD-10-CM

## 2020-02-17 LAB
GI HISTOLOGY: A. UNSPECIFIED: (no result)
GI HISTOLOGY: B. UNSPECIFIED: (no result)
GI HISTOLOGY: C. UNSPECIFIED: (no result)
GI HISTOLOGY: D. UNSPECIFIED: (no result)
GI HISTOLOGY: PDF REPORT: (no result)

## 2020-02-17 PROCEDURE — 88305 TISSUE EXAM BY PATHOLOGIST: CPT | Performed by: INTERNAL MEDICINE

## 2020-02-17 PROCEDURE — 45380 COLONOSCOPY AND BIOPSY: CPT | Mod: PT | Performed by: INTERNAL MEDICINE

## 2020-05-15 VITALS — WEIGHT: 205 LBS | HEIGHT: 70 IN

## 2020-05-18 ENCOUNTER — OFFICE (OUTPATIENT)
Dept: URBAN - METROPOLITAN AREA CLINIC 75 | Facility: CLINIC | Age: 75
End: 2020-05-18

## 2020-05-18 DIAGNOSIS — Z80.0 FAMILY HISTORY OF MALIGNANT NEOPLASM OF DIGESTIVE ORGANS: ICD-10-CM

## 2020-05-18 DIAGNOSIS — K57.30 DIVERTICULOSIS OF LARGE INTESTINE WITHOUT PERFORATION OR ABS: ICD-10-CM

## 2020-05-18 DIAGNOSIS — K55.9 VASCULAR DISORDER OF INTESTINE, UNSPECIFIED: ICD-10-CM

## 2020-05-18 DIAGNOSIS — Z86.010 PERSONAL HISTORY OF COLONIC POLYPS: ICD-10-CM

## 2020-05-18 PROBLEM — D12.2 BENIGN NEOPLASM OF ASCENDING COLON: Status: INACTIVE | Noted: 2020-02-17

## 2020-05-18 PROBLEM — K57.33 DVTRCLI OF LG INT W/O PERFORATION OR ABSCESS W BLEEDING: Status: INACTIVE | Noted: 2020-02-17

## 2020-05-18 PROCEDURE — 99213 OFFICE O/P EST LOW 20 MIN: CPT | Mod: 95 | Performed by: INTERNAL MEDICINE

## 2020-05-18 NOTE — SERVICEHPINOTES
12/18/2019 I did have the pleasure of seeing JASON TAYLOR [phone] at the request of JAVIER GRIFFIN for a new patient consultation in our [facility] office. I sincerely appreciate the opportunity to participate in the care of this patient. he has experienced a change in bowels and needs a local GI doc ------ daily Metamucil and 2oz mag citrate daily to keep him regular but over past few months much more irregular w/o explanation stool caliber different incomplete evacuation no blood more gas than usual has also used MOM on several occasions BRFather and PGM colon CA colon cancer BRlived in [place] then in [place] moved to [place] early 2018 BRno UGI sx BRused to have CN q2yrs but spread them out 2011 -- 2015 hyperplastic polyp -- now due again BRjoan urologic issues recurrent UTIs BPH now has to self-cathBR==========================================BR2/17/2020 Colonoscopy today at [facility]==========================================5/18/20:  Telemedicine visit due to coronavirus pandemicBRreally doing fairly well   a little bothersome flatulence and occasional abdominal distress  very mild, fleeting BRno blood in stool  bowels formed  no dietary factors are bothersome BRwe reviewed Colon findings and the disconnect b/t mucosal findings and his symptomsBRno F/C  no respiratory sx BRhe is on Miralax and only occasional Metamucil [worried about bloating]  eats quite a bit of fiberBRoff mag citrateBRuses All Bran   no more Mag Citrate BRfeels he is not evacuating as well as before since stopping Metamucil dose BRnot consistent with Probioitic BRsaw Dr Wahl today for some lower extremity issues   -- has some venous insufficiency

## 2020-07-29 ENCOUNTER — APPOINTMENT (OUTPATIENT)
Dept: PREADMISSION TESTING | Facility: HOSPITAL | Age: 75
End: 2020-07-29

## 2020-07-29 VITALS
HEIGHT: 70 IN | DIASTOLIC BLOOD PRESSURE: 85 MMHG | BODY MASS INDEX: 30.49 KG/M2 | WEIGHT: 213 LBS | RESPIRATION RATE: 18 BRPM | TEMPERATURE: 98.6 F | OXYGEN SATURATION: 97 % | HEART RATE: 70 BPM | SYSTOLIC BLOOD PRESSURE: 151 MMHG

## 2020-07-29 LAB
ANION GAP SERPL CALCULATED.3IONS-SCNC: 11.4 MMOL/L (ref 5–15)
BUN SERPL-MCNC: 24 MG/DL (ref 8–23)
BUN/CREAT SERPL: 27.6 (ref 7–25)
CALCIUM SPEC-SCNC: 9.2 MG/DL (ref 8.6–10.5)
CHLORIDE SERPL-SCNC: 100 MMOL/L (ref 98–107)
CO2 SERPL-SCNC: 22.6 MMOL/L (ref 22–29)
CREAT SERPL-MCNC: 0.87 MG/DL (ref 0.76–1.27)
DEPRECATED RDW RBC AUTO: 42.3 FL (ref 37–54)
ERYTHROCYTE [DISTWIDTH] IN BLOOD BY AUTOMATED COUNT: 12.4 % (ref 12.3–15.4)
GFR SERPL CREATININE-BSD FRML MDRD: 86 ML/MIN/1.73
GLUCOSE SERPL-MCNC: 114 MG/DL (ref 65–99)
HCT VFR BLD AUTO: 44.2 % (ref 37.5–51)
HGB BLD-MCNC: 15 G/DL (ref 13–17.7)
MCH RBC QN AUTO: 31.3 PG (ref 26.6–33)
MCHC RBC AUTO-ENTMCNC: 33.9 G/DL (ref 31.5–35.7)
MCV RBC AUTO: 92.3 FL (ref 79–97)
PLATELET # BLD AUTO: 231 10*3/MM3 (ref 140–450)
PMV BLD AUTO: 10.1 FL (ref 6–12)
POTASSIUM SERPL-SCNC: 4.5 MMOL/L (ref 3.5–5.2)
RBC # BLD AUTO: 4.79 10*6/MM3 (ref 4.14–5.8)
SODIUM SERPL-SCNC: 134 MMOL/L (ref 136–145)
WBC # BLD AUTO: 4.86 10*3/MM3 (ref 3.4–10.8)

## 2020-07-29 PROCEDURE — C9803 HOPD COVID-19 SPEC COLLECT: HCPCS

## 2020-07-29 PROCEDURE — 93010 ELECTROCARDIOGRAM REPORT: CPT | Performed by: INTERNAL MEDICINE

## 2020-07-29 PROCEDURE — 85027 COMPLETE CBC AUTOMATED: CPT | Performed by: UROLOGY

## 2020-07-29 PROCEDURE — 93005 ELECTROCARDIOGRAM TRACING: CPT

## 2020-07-29 PROCEDURE — U0004 COV-19 TEST NON-CDC HGH THRU: HCPCS | Performed by: NURSE PRACTITIONER

## 2020-07-29 PROCEDURE — 36415 COLL VENOUS BLD VENIPUNCTURE: CPT

## 2020-07-29 PROCEDURE — 80048 BASIC METABOLIC PNL TOTAL CA: CPT | Performed by: UROLOGY

## 2020-07-30 LAB
REF LAB TEST METHOD: NORMAL
SARS-COV-2 RNA RESP QL NAA+PROBE: NOT DETECTED

## 2020-07-31 ENCOUNTER — ANESTHESIA (OUTPATIENT)
Dept: PERIOP | Facility: HOSPITAL | Age: 75
End: 2020-07-31

## 2020-07-31 ENCOUNTER — ANESTHESIA EVENT (OUTPATIENT)
Dept: PERIOP | Facility: HOSPITAL | Age: 75
End: 2020-07-31

## 2020-07-31 ENCOUNTER — HOSPITAL ENCOUNTER (OUTPATIENT)
Facility: HOSPITAL | Age: 75
Setting detail: HOSPITAL OUTPATIENT SURGERY
Discharge: HOME | End: 2020-07-31
Attending: UROLOGY | Admitting: UROLOGY

## 2020-07-31 VITALS
BODY MASS INDEX: 30.43 KG/M2 | SYSTOLIC BLOOD PRESSURE: 144 MMHG | OXYGEN SATURATION: 96 % | HEIGHT: 70 IN | HEART RATE: 65 BPM | RESPIRATION RATE: 16 BRPM | TEMPERATURE: 98.2 F | DIASTOLIC BLOOD PRESSURE: 82 MMHG | WEIGHT: 212.52 LBS

## 2020-07-31 DIAGNOSIS — N32.0 BLADDER NECK CONTRACTURE: Primary | ICD-10-CM

## 2020-07-31 PROCEDURE — 25010000002 ONDANSETRON PER 1 MG: Performed by: NURSE ANESTHETIST, CERTIFIED REGISTERED

## 2020-07-31 PROCEDURE — 25010000002 PROPOFOL 10 MG/ML EMULSION: Performed by: NURSE ANESTHETIST, CERTIFIED REGISTERED

## 2020-07-31 PROCEDURE — C1769 GUIDE WIRE: HCPCS | Performed by: UROLOGY

## 2020-07-31 PROCEDURE — 25010000002 FENTANYL CITRATE (PF) 100 MCG/2ML SOLUTION: Performed by: NURSE ANESTHETIST, CERTIFIED REGISTERED

## 2020-07-31 PROCEDURE — 25010000002 DEXAMETHASONE PER 1 MG: Performed by: NURSE ANESTHETIST, CERTIFIED REGISTERED

## 2020-07-31 PROCEDURE — 25010000003 CEFAZOLIN IN DEXTROSE 2-4 GM/100ML-% SOLUTION: Performed by: UROLOGY

## 2020-07-31 RX ORDER — NALOXONE HCL 0.4 MG/ML
0.2 VIAL (ML) INJECTION AS NEEDED
Status: DISCONTINUED | OUTPATIENT
Start: 2020-07-31 | End: 2020-07-31 | Stop reason: HOSPADM

## 2020-07-31 RX ORDER — OXYCODONE AND ACETAMINOPHEN 7.5; 325 MG/1; MG/1
1 TABLET ORAL ONCE AS NEEDED
Status: DISCONTINUED | OUTPATIENT
Start: 2020-07-31 | End: 2020-07-31 | Stop reason: HOSPADM

## 2020-07-31 RX ORDER — SODIUM CHLORIDE 0.9 % (FLUSH) 0.9 %
3-10 SYRINGE (ML) INJECTION AS NEEDED
Status: DISCONTINUED | OUTPATIENT
Start: 2020-07-31 | End: 2020-07-31 | Stop reason: HOSPADM

## 2020-07-31 RX ORDER — FENTANYL CITRATE 50 UG/ML
50 INJECTION, SOLUTION INTRAMUSCULAR; INTRAVENOUS
Status: DISCONTINUED | OUTPATIENT
Start: 2020-07-31 | End: 2020-07-31 | Stop reason: HOSPADM

## 2020-07-31 RX ORDER — HYDRALAZINE HYDROCHLORIDE 20 MG/ML
5 INJECTION INTRAMUSCULAR; INTRAVENOUS
Status: DISCONTINUED | OUTPATIENT
Start: 2020-07-31 | End: 2020-07-31 | Stop reason: HOSPADM

## 2020-07-31 RX ORDER — CEFAZOLIN SODIUM 2 G/100ML
2 INJECTION, SOLUTION INTRAVENOUS ONCE
Status: COMPLETED | OUTPATIENT
Start: 2020-07-31 | End: 2020-07-31

## 2020-07-31 RX ORDER — LIDOCAINE HYDROCHLORIDE 10 MG/ML
0.5 INJECTION, SOLUTION EPIDURAL; INFILTRATION; INTRACAUDAL; PERINEURAL ONCE AS NEEDED
Status: DISCONTINUED | OUTPATIENT
Start: 2020-07-31 | End: 2020-07-31 | Stop reason: HOSPADM

## 2020-07-31 RX ORDER — ACETAMINOPHEN 500 MG
500 TABLET ORAL EVERY 6 HOURS PRN
COMMUNITY

## 2020-07-31 RX ORDER — SILDENAFIL 50 MG/1
50 TABLET, FILM COATED ORAL DAILY PRN
COMMUNITY

## 2020-07-31 RX ORDER — HYDROCODONE BITARTRATE AND ACETAMINOPHEN 7.5; 325 MG/1; MG/1
1 TABLET ORAL ONCE AS NEEDED
Status: DISCONTINUED | OUTPATIENT
Start: 2020-07-31 | End: 2020-07-31 | Stop reason: HOSPADM

## 2020-07-31 RX ORDER — FAMOTIDINE 10 MG/ML
20 INJECTION, SOLUTION INTRAVENOUS ONCE
Status: COMPLETED | OUTPATIENT
Start: 2020-07-31 | End: 2020-07-31

## 2020-07-31 RX ORDER — ONDANSETRON 2 MG/ML
INJECTION INTRAMUSCULAR; INTRAVENOUS AS NEEDED
Status: DISCONTINUED | OUTPATIENT
Start: 2020-07-31 | End: 2020-07-31 | Stop reason: SURG

## 2020-07-31 RX ORDER — LABETALOL HYDROCHLORIDE 5 MG/ML
5 INJECTION, SOLUTION INTRAVENOUS
Status: DISCONTINUED | OUTPATIENT
Start: 2020-07-31 | End: 2020-07-31 | Stop reason: HOSPADM

## 2020-07-31 RX ORDER — ONDANSETRON 2 MG/ML
4 INJECTION INTRAMUSCULAR; INTRAVENOUS ONCE AS NEEDED
Status: DISCONTINUED | OUTPATIENT
Start: 2020-07-31 | End: 2020-07-31 | Stop reason: HOSPADM

## 2020-07-31 RX ORDER — PROPOFOL 10 MG/ML
VIAL (ML) INTRAVENOUS AS NEEDED
Status: DISCONTINUED | OUTPATIENT
Start: 2020-07-31 | End: 2020-07-31 | Stop reason: SURG

## 2020-07-31 RX ORDER — HYDROCODONE BITARTRATE AND ACETAMINOPHEN 5; 325 MG/1; MG/1
1-2 TABLET ORAL EVERY 4 HOURS PRN
Qty: 5 TABLET | Refills: 0 | Status: SHIPPED | OUTPATIENT
Start: 2020-07-31

## 2020-07-31 RX ORDER — ACETAMINOPHEN 325 MG/1
650 TABLET ORAL ONCE AS NEEDED
Status: DISCONTINUED | OUTPATIENT
Start: 2020-07-31 | End: 2020-07-31 | Stop reason: HOSPADM

## 2020-07-31 RX ORDER — LIDOCAINE HYDROCHLORIDE 20 MG/ML
INJECTION, SOLUTION INFILTRATION; PERINEURAL AS NEEDED
Status: DISCONTINUED | OUTPATIENT
Start: 2020-07-31 | End: 2020-07-31 | Stop reason: SURG

## 2020-07-31 RX ORDER — SODIUM CHLORIDE, SODIUM LACTATE, POTASSIUM CHLORIDE, CALCIUM CHLORIDE 600; 310; 30; 20 MG/100ML; MG/100ML; MG/100ML; MG/100ML
9 INJECTION, SOLUTION INTRAVENOUS CONTINUOUS
Status: DISCONTINUED | OUTPATIENT
Start: 2020-07-31 | End: 2020-07-31 | Stop reason: HOSPADM

## 2020-07-31 RX ORDER — EPHEDRINE SULFATE 50 MG/ML
5 INJECTION, SOLUTION INTRAVENOUS ONCE AS NEEDED
Status: DISCONTINUED | OUTPATIENT
Start: 2020-07-31 | End: 2020-07-31 | Stop reason: HOSPADM

## 2020-07-31 RX ORDER — FENTANYL CITRATE 50 UG/ML
INJECTION, SOLUTION INTRAMUSCULAR; INTRAVENOUS AS NEEDED
Status: DISCONTINUED | OUTPATIENT
Start: 2020-07-31 | End: 2020-07-31 | Stop reason: SURG

## 2020-07-31 RX ORDER — DIPHENHYDRAMINE HYDROCHLORIDE 50 MG/ML
12.5 INJECTION INTRAMUSCULAR; INTRAVENOUS
Status: DISCONTINUED | OUTPATIENT
Start: 2020-07-31 | End: 2020-07-31 | Stop reason: HOSPADM

## 2020-07-31 RX ORDER — MAGNESIUM HYDROXIDE 1200 MG/15ML
LIQUID ORAL AS NEEDED
Status: DISCONTINUED | OUTPATIENT
Start: 2020-07-31 | End: 2020-07-31 | Stop reason: HOSPADM

## 2020-07-31 RX ORDER — SODIUM CHLORIDE 0.9 % (FLUSH) 0.9 %
3 SYRINGE (ML) INJECTION EVERY 12 HOURS SCHEDULED
Status: DISCONTINUED | OUTPATIENT
Start: 2020-07-31 | End: 2020-07-31 | Stop reason: HOSPADM

## 2020-07-31 RX ORDER — DIPHENHYDRAMINE HCL 25 MG
25 CAPSULE ORAL
Status: DISCONTINUED | OUTPATIENT
Start: 2020-07-31 | End: 2020-07-31 | Stop reason: HOSPADM

## 2020-07-31 RX ORDER — DEXAMETHASONE SODIUM PHOSPHATE 10 MG/ML
INJECTION INTRAMUSCULAR; INTRAVENOUS AS NEEDED
Status: DISCONTINUED | OUTPATIENT
Start: 2020-07-31 | End: 2020-07-31 | Stop reason: SURG

## 2020-07-31 RX ORDER — SULFAMETHOXAZOLE AND TRIMETHOPRIM 800; 160 MG/1; MG/1
1 TABLET ORAL 2 TIMES DAILY
Qty: 14 TABLET | Refills: 0 | Status: SHIPPED | OUTPATIENT
Start: 2020-07-31

## 2020-07-31 RX ADMIN — CEFAZOLIN SODIUM 2 G: 2 INJECTION, SOLUTION INTRAVENOUS at 12:30

## 2020-07-31 RX ADMIN — ONDANSETRON HYDROCHLORIDE 4 MG: 2 SOLUTION INTRAMUSCULAR; INTRAVENOUS at 12:51

## 2020-07-31 RX ADMIN — LIDOCAINE HYDROCHLORIDE 100 MG: 20 INJECTION, SOLUTION INFILTRATION; PERINEURAL at 12:35

## 2020-07-31 RX ADMIN — FENTANYL CITRATE 100 MCG: 50 INJECTION INTRAMUSCULAR; INTRAVENOUS at 12:35

## 2020-07-31 RX ADMIN — PROPOFOL 200 MG: 10 INJECTION, EMULSION INTRAVENOUS at 12:35

## 2020-07-31 RX ADMIN — FAMOTIDINE 20 MG: 10 INJECTION INTRAVENOUS at 11:03

## 2020-07-31 RX ADMIN — PROPOFOL 50 MG: 10 INJECTION, EMULSION INTRAVENOUS at 12:40

## 2020-07-31 RX ADMIN — SODIUM CHLORIDE, POTASSIUM CHLORIDE, SODIUM LACTATE AND CALCIUM CHLORIDE 9 ML/HR: 600; 310; 30; 20 INJECTION, SOLUTION INTRAVENOUS at 11:03

## 2020-07-31 RX ADMIN — DEXAMETHASONE SODIUM PHOSPHATE 6 MG: 10 INJECTION INTRAMUSCULAR; INTRAVENOUS at 12:44

## 2020-07-31 NOTE — ANESTHESIA PROCEDURE NOTES
Airway  Urgency: elective    Date/Time: 7/31/2020 12:37 PM  Airway not difficult    General Information and Staff    Patient location during procedure: OR  CRNA: Alycia Padron CRNA    Indications and Patient Condition  Indications for airway management: airway protection    Preoxygenated: yes  Mask difficulty assessment: 1 - vent by mask    Final Airway Details  Final airway type: supraglottic airway      Successful airway: classic  Size 5    Number of attempts at approach: 1  Assessment: lips, teeth, and gum same as pre-op    Additional Comments  LMA placed easily.  Cuff MOP.

## 2020-07-31 NOTE — ANESTHESIA POSTPROCEDURE EVALUATION
Patient: Lazarus Roe    Procedure Summary     Date:  07/31/20 Room / Location:  [facility] OR 01 / [facility] MAIN OR    Anesthesia Start:  1230 Anesthesia Stop:  1313    Procedure:  CYSTOSCOPY URETHROTOMY VISUAL INTERNAL (Bilateral ) Diagnosis:      Surgeon:  Catarino Weber Jr., MD Provider:  Allie Whaley MD    Anesthesia Type:  general ASA Status:  2          Anesthesia Type: general    Vitals  Vitals Value Taken Time   /88 7/31/2020  2:00 PM   Temp 36.8 °C (98.2 °F) 7/31/2020  2:00 PM   Pulse 63 7/31/2020  2:06 PM   Resp 16 7/31/2020  2:00 PM   SpO2 96 % 7/31/2020  2:06 PM   Vitals shown include unvalidated device data.        Post Anesthesia Care and Evaluation    Patient location during evaluation: bedside  Patient participation: complete - patient participated  Level of consciousness: awake  Pain management: adequate  Airway patency: patent  Anesthetic complications: No anesthetic complications  PONV Status: none  Cardiovascular status: acceptable  Respiratory status: acceptable  Hydration status: acceptable  Post Neuraxial Block status: Motor and sensory function returned to baseline

## 2020-07-31 NOTE — ANESTHESIA PREPROCEDURE EVALUATION
Anesthesia Evaluation     Patient summary reviewed and Nursing notes reviewed   NPO Solid Status: > 8 hours  NPO Liquid Status: > 2 hours           Airway   Mallampati: I  TM distance: >3 FB  Neck ROM: full  No difficulty expected  Dental - normal exam     Pulmonary - negative pulmonary ROS and normal exam   Cardiovascular - normal exam  Exercise tolerance: good (4-7 METS)    (+) hypertension, valvular problems/murmurs MVP,       Neuro/Psych  (+) psychiatric history,     GI/Hepatic/Renal/Endo - negative ROS     Musculoskeletal (-) negative ROS    Abdominal  - normal exam    Bowel sounds: normal.   Substance History - negative use     OB/GYN negative ob/gyn ROS         Other                        Anesthesia Plan    ASA 2     general     intravenous induction     Anesthetic plan, all risks, benefits, and alternatives have been provided, discussed and informed consent has been obtained with: patient.    Plan discussed with CRNA and attending.

## 2021-02-04 NOTE — PROGRESS NOTES
Continued Stay Note  [facility]     Patient Name: Lazarus Roe  MRN: [id number]  Today's Date: 10/10/2019    Admit Date: 10/7/2019    Discharge Plan     Row Name 10/10/19 1041       Plan    Plan  Home with  infusion and PICC line care and labs at [facility].     Plan Comments  CCP Manager Eliane Gabby was informed by [facility] that the patient had an appointment scheduled for Wednesday and that they have already contacted the patient about his appointment.  JUANY Sahu        Discharge Codes    No documentation.       Expected Discharge Date and Time     Expected Discharge Date Expected Discharge Time    Oct 9, 2019             JUANY Bush     stretcher

## 2021-03-09 DIAGNOSIS — Z23 IMMUNIZATION DUE: ICD-10-CM

## 2021-03-23 VITALS
DIASTOLIC BLOOD PRESSURE: 114 MMHG | DIASTOLIC BLOOD PRESSURE: 108 MMHG | SYSTOLIC BLOOD PRESSURE: 154 MMHG | HEART RATE: 58 BPM | SYSTOLIC BLOOD PRESSURE: 158 MMHG | HEART RATE: 74 BPM | OXYGEN SATURATION: 95 % | DIASTOLIC BLOOD PRESSURE: 112 MMHG | SYSTOLIC BLOOD PRESSURE: 181 MMHG | HEART RATE: 64 BPM | DIASTOLIC BLOOD PRESSURE: 94 MMHG | SYSTOLIC BLOOD PRESSURE: 164 MMHG | HEART RATE: 70 BPM | DIASTOLIC BLOOD PRESSURE: 110 MMHG | SYSTOLIC BLOOD PRESSURE: 166 MMHG | DIASTOLIC BLOOD PRESSURE: 97 MMHG | HEART RATE: 69 BPM | TEMPERATURE: 97.8 F | DIASTOLIC BLOOD PRESSURE: 100 MMHG | HEIGHT: 70 IN | RESPIRATION RATE: 10 BRPM | DIASTOLIC BLOOD PRESSURE: 113 MMHG | DIASTOLIC BLOOD PRESSURE: 107 MMHG | SYSTOLIC BLOOD PRESSURE: 183 MMHG | RESPIRATION RATE: 11 BRPM | HEART RATE: 66 BPM | SYSTOLIC BLOOD PRESSURE: 194 MMHG | HEART RATE: 59 BPM | OXYGEN SATURATION: 98 % | DIASTOLIC BLOOD PRESSURE: 103 MMHG | HEART RATE: 73 BPM | HEART RATE: 65 BPM | HEART RATE: 72 BPM | SYSTOLIC BLOOD PRESSURE: 170 MMHG | HEART RATE: 68 BPM | WEIGHT: 215 LBS | DIASTOLIC BLOOD PRESSURE: 106 MMHG | RESPIRATION RATE: 15 BRPM | SYSTOLIC BLOOD PRESSURE: 209 MMHG | SYSTOLIC BLOOD PRESSURE: 195 MMHG | SYSTOLIC BLOOD PRESSURE: 184 MMHG | OXYGEN SATURATION: 100 % | SYSTOLIC BLOOD PRESSURE: 169 MMHG | RESPIRATION RATE: 12 BRPM | DIASTOLIC BLOOD PRESSURE: 116 MMHG | RESPIRATION RATE: 13 BRPM | HEART RATE: 67 BPM | DIASTOLIC BLOOD PRESSURE: 120 MMHG | OXYGEN SATURATION: 97 %

## 2021-03-25 ENCOUNTER — OFFICE (OUTPATIENT)
Dept: URBAN - METROPOLITAN AREA PATHOLOGY 4 | Facility: PATHOLOGY | Age: 76
End: 2021-03-25
Payer: COMMERCIAL

## 2021-03-25 ENCOUNTER — AMBULATORY SURGICAL CENTER (OUTPATIENT)
Dept: URBAN - METROPOLITAN AREA SURGERY 17 | Facility: SURGERY | Age: 76
End: 2021-03-25
Payer: COMMERCIAL

## 2021-03-25 DIAGNOSIS — K63.5 POLYP OF COLON: ICD-10-CM

## 2021-03-25 DIAGNOSIS — D12.2 BENIGN NEOPLASM OF ASCENDING COLON: ICD-10-CM

## 2021-03-25 DIAGNOSIS — D12.3 BENIGN NEOPLASM OF TRANSVERSE COLON: ICD-10-CM

## 2021-03-25 DIAGNOSIS — Z86.010 PERSONAL HISTORY OF COLONIC POLYPS: ICD-10-CM

## 2021-03-25 DIAGNOSIS — Z80.0 FAMILY HISTORY OF MALIGNANT NEOPLASM OF DIGESTIVE ORGANS: ICD-10-CM

## 2021-03-25 DIAGNOSIS — K62.1 RECTAL POLYP: ICD-10-CM

## 2021-03-25 DIAGNOSIS — K57.30 DIVERTICULOSIS OF LARGE INTESTINE WITHOUT PERFORATION OR ABS: ICD-10-CM

## 2021-03-25 DIAGNOSIS — D12.8 BENIGN NEOPLASM OF RECTUM: ICD-10-CM

## 2021-03-25 DIAGNOSIS — K55.9 VASCULAR DISORDER OF INTESTINE, UNSPECIFIED: ICD-10-CM

## 2021-03-25 LAB
GI HISTOLOGY: A. UNSPECIFIED: (no result)
GI HISTOLOGY: B. SELECT: (no result)
GI HISTOLOGY: C. UNSPECIFIED: (no result)
GI HISTOLOGY: D. UNSPECIFIED: (no result)
GI HISTOLOGY: PDF REPORT: (no result)

## 2021-03-25 PROCEDURE — 88305 TISSUE EXAM BY PATHOLOGIST: CPT | Performed by: INTERNAL MEDICINE

## 2021-03-25 PROCEDURE — 45380 COLONOSCOPY AND BIOPSY: CPT | Mod: 59 | Performed by: INTERNAL MEDICINE

## 2021-03-25 PROCEDURE — 45385 COLONOSCOPY W/LESION REMOVAL: CPT | Performed by: INTERNAL MEDICINE

## 2024-03-29 VITALS
SYSTOLIC BLOOD PRESSURE: 161 MMHG | SYSTOLIC BLOOD PRESSURE: 133 MMHG | OXYGEN SATURATION: 98 % | DIASTOLIC BLOOD PRESSURE: 72 MMHG | SYSTOLIC BLOOD PRESSURE: 169 MMHG | SYSTOLIC BLOOD PRESSURE: 159 MMHG | DIASTOLIC BLOOD PRESSURE: 70 MMHG | SYSTOLIC BLOOD PRESSURE: 171 MMHG | HEART RATE: 68 BPM | WEIGHT: 183 LBS | DIASTOLIC BLOOD PRESSURE: 66 MMHG | OXYGEN SATURATION: 99 % | HEIGHT: 70 IN | DIASTOLIC BLOOD PRESSURE: 82 MMHG | SYSTOLIC BLOOD PRESSURE: 145 MMHG | HEART RATE: 66 BPM | DIASTOLIC BLOOD PRESSURE: 74 MMHG | SYSTOLIC BLOOD PRESSURE: 131 MMHG | TEMPERATURE: 97.5 F | HEART RATE: 71 BPM | DIASTOLIC BLOOD PRESSURE: 83 MMHG | SYSTOLIC BLOOD PRESSURE: 121 MMHG | RESPIRATION RATE: 12 BRPM | DIASTOLIC BLOOD PRESSURE: 84 MMHG | DIASTOLIC BLOOD PRESSURE: 87 MMHG | HEART RATE: 65 BPM | DIASTOLIC BLOOD PRESSURE: 79 MMHG | OXYGEN SATURATION: 100 % | TEMPERATURE: 67.2 F | SYSTOLIC BLOOD PRESSURE: 138 MMHG | HEART RATE: 69 BPM | RESPIRATION RATE: 18 BRPM | HEART RATE: 60 BPM | RESPIRATION RATE: 11 BRPM | OXYGEN SATURATION: 95 % | SYSTOLIC BLOOD PRESSURE: 155 MMHG | HEART RATE: 63 BPM | HEART RATE: 64 BPM | SYSTOLIC BLOOD PRESSURE: 152 MMHG | DIASTOLIC BLOOD PRESSURE: 67 MMHG | RESPIRATION RATE: 10 BRPM | RESPIRATION RATE: 14 BRPM

## 2024-04-02 ENCOUNTER — AMBULATORY SURGICAL CENTER (OUTPATIENT)
Dept: URBAN - METROPOLITAN AREA SURGERY 17 | Facility: SURGERY | Age: 79
End: 2024-04-02
Payer: MEDICARE

## 2024-04-02 ENCOUNTER — OFFICE (OUTPATIENT)
Dept: URBAN - METROPOLITAN AREA PATHOLOGY 4 | Facility: PATHOLOGY | Age: 79
End: 2024-04-02
Payer: MEDICARE

## 2024-04-02 DIAGNOSIS — Z86.010 PERSONAL HISTORY OF COLONIC POLYPS: ICD-10-CM

## 2024-04-02 DIAGNOSIS — Z80.0 FAMILY HISTORY OF MALIGNANT NEOPLASM OF DIGESTIVE ORGANS: ICD-10-CM

## 2024-04-02 DIAGNOSIS — Z09 ENCOUNTER FOR FOLLOW-UP EXAMINATION AFTER COMPLETED TREATMEN: ICD-10-CM

## 2024-04-02 DIAGNOSIS — D12.3 BENIGN NEOPLASM OF TRANSVERSE COLON: ICD-10-CM

## 2024-04-02 DIAGNOSIS — K57.30 DIVERTICULOSIS OF LARGE INTESTINE WITHOUT PERFORATION OR ABS: ICD-10-CM

## 2024-04-02 LAB
GI HISTOLOGY: PDF REPORT: (no result)
Lab: (no result)

## 2024-04-02 PROCEDURE — 45385 COLONOSCOPY W/LESION REMOVAL: CPT | Mod: PT | Performed by: INTERNAL MEDICINE

## 2024-04-02 PROCEDURE — 88305 TISSUE EXAM BY PATHOLOGIST: CPT | Performed by: PATHOLOGY

## (undated) DEVICE — TIDISHIELD UROLOGY DRAIN BAGS FROSTY VINYL STERILE FITS SIEMENS UROSKOP ACCESS 20 PER CASE: Brand: TIDISHIELD

## (undated) DEVICE — NITINOL WIRE WITH HYDROPHILIC TIP: Brand: SENSOR

## (undated) DEVICE — GLV SURG BIOGEL LTX PF 7

## (undated) DEVICE — LOU CYSTO: Brand: MEDLINE INDUSTRIES, INC.

## (undated) DEVICE — BLD CUT STR

## (undated) DEVICE — 1419G LTX CATH 30CC 2-WAY 18FR: Brand: DOVER

## (undated) DEVICE — DRAINBAG,ANTI-REFLUX TOWER,L/F,2000ML,LL: Brand: MEDLINE